# Patient Record
Sex: FEMALE | Race: WHITE | NOT HISPANIC OR LATINO | Employment: STUDENT | URBAN - METROPOLITAN AREA
[De-identification: names, ages, dates, MRNs, and addresses within clinical notes are randomized per-mention and may not be internally consistent; named-entity substitution may affect disease eponyms.]

---

## 2023-04-18 ENCOUNTER — TELEPHONE (OUTPATIENT)
Dept: PRIMARY CARE | Facility: CLINIC | Age: 18
End: 2023-04-18
Payer: COMMERCIAL

## 2023-04-18 DIAGNOSIS — F41.9 ANXIETY: Primary | ICD-10-CM

## 2023-04-18 RX ORDER — DULOXETINE 40 MG/1
40 CAPSULE, DELAYED RELEASE ORAL DAILY
Qty: 30 CAPSULE | Refills: 2 | Status: SHIPPED | OUTPATIENT
Start: 2023-04-18 | End: 2023-07-07

## 2023-04-30 DIAGNOSIS — F41.9 ANXIETY DISORDER, UNSPECIFIED: ICD-10-CM

## 2023-04-30 RX ORDER — DULOXETIN HYDROCHLORIDE 20 MG/1
CAPSULE, DELAYED RELEASE ORAL
Qty: 30 CAPSULE | Refills: 2 | OUTPATIENT
Start: 2023-04-30

## 2023-04-30 NOTE — TELEPHONE ENCOUNTER
Requested Prescriptions     Refused Prescriptions Disp Refills    DULoxetine (Cymbalta) 20 mg DR capsule [Pharmacy Med Name: DULOXETINE HCL DR 20 MG CAP] 30 capsule 2     Sig: TAKE 1 CAPSULE BY MOUTH EVERY DAY     Had renewed Rx for 2 caps per day

## 2023-05-09 NOTE — TELEPHONE ENCOUNTER
Patient felt she was doing significantly better on higher dose of duloxetine, plan to continue that through the end of her semester and revisit over the summer  Also seems to have had some benefit with use of probiotic, question continuing that - has been working with behavioral health at school

## 2023-05-17 DIAGNOSIS — J30.2 ACUTE SEASONAL ALLERGIC RHINITIS: ICD-10-CM

## 2023-05-17 RX ORDER — FLUTICASONE PROPIONATE 50 MCG
1 SPRAY, SUSPENSION (ML) NASAL DAILY
COMMUNITY
Start: 2014-07-27

## 2023-05-17 RX ORDER — PREDNISONE 10 MG/1
TABLET ORAL
COMMUNITY
Start: 2022-07-05 | End: 2023-05-17 | Stop reason: SDUPTHER

## 2023-05-17 RX ORDER — BECLOMETHASONE DIPROPIONATE HFA 80 UG/1
2 AEROSOL, METERED RESPIRATORY (INHALATION) 2 TIMES DAILY
COMMUNITY
End: 2023-07-21 | Stop reason: HOSPADM

## 2023-05-17 RX ORDER — ALBUTEROL SULFATE 90 UG/1
1-2 AEROSOL, METERED RESPIRATORY (INHALATION)
COMMUNITY
End: 2023-11-22 | Stop reason: HOSPADM

## 2023-05-17 RX ORDER — PROPRANOLOL HYDROCHLORIDE 10 MG/1
TABLET ORAL
COMMUNITY
Start: 2023-01-27 | End: 2023-07-21 | Stop reason: HOSPADM

## 2023-05-17 RX ORDER — PREDNISONE 10 MG/1
TABLET ORAL
Qty: 25 TABLET | Refills: 1 | Status: SHIPPED | OUTPATIENT
Start: 2023-05-17 | End: 2023-07-21 | Stop reason: SDUPTHER

## 2023-05-17 RX ORDER — HYDROXYZINE HYDROCHLORIDE 10 MG/1
TABLET, FILM COATED ORAL
COMMUNITY
Start: 2023-01-27 | End: 2023-07-21 | Stop reason: HOSPADM

## 2023-05-17 RX ORDER — ALBUTEROL SULFATE 0.83 MG/ML
SOLUTION RESPIRATORY (INHALATION)
COMMUNITY

## 2023-05-17 RX ORDER — NORGESTIMATE AND ETHINYL ESTRADIOL 7DAYSX3 28
1 KIT ORAL DAILY
COMMUNITY
End: 2023-11-22 | Stop reason: HOSPADM

## 2023-05-17 RX ORDER — MONTELUKAST SODIUM 10 MG/1
10 TABLET ORAL NIGHTLY
COMMUNITY
End: 2023-07-09

## 2023-05-23 DIAGNOSIS — Z00.00 HEALTHCARE MAINTENANCE: ICD-10-CM

## 2023-07-07 DIAGNOSIS — F41.9 ANXIETY: ICD-10-CM

## 2023-07-07 RX ORDER — DULOXETINE 40 MG/1
CAPSULE, DELAYED RELEASE ORAL
Qty: 30 CAPSULE | Refills: 1 | Status: SHIPPED | OUTPATIENT
Start: 2023-07-07 | End: 2023-09-15

## 2023-07-08 DIAGNOSIS — J45.30 MILD PERSISTENT ASTHMA WITHOUT COMPLICATION (HHS-HCC): Primary | ICD-10-CM

## 2023-07-09 RX ORDER — MONTELUKAST SODIUM 10 MG/1
TABLET ORAL
Qty: 30 TABLET | Refills: 1 | Status: SHIPPED | OUTPATIENT
Start: 2023-07-09 | End: 2023-09-21

## 2023-07-14 ENCOUNTER — LAB (OUTPATIENT)
Dept: LAB | Facility: LAB | Age: 18
End: 2023-07-14
Payer: COMMERCIAL

## 2023-07-14 DIAGNOSIS — Z00.00 HEALTHCARE MAINTENANCE: ICD-10-CM

## 2023-07-14 LAB
ALANINE AMINOTRANSFERASE (SGPT) (U/L) IN SER/PLAS: 12 U/L (ref 7–45)
ALBUMIN (G/DL) IN SER/PLAS: 4.2 G/DL (ref 3.4–5)
ALKALINE PHOSPHATASE (U/L) IN SER/PLAS: 70 U/L (ref 33–110)
ANION GAP IN SER/PLAS: 11 MMOL/L (ref 10–20)
APPEARANCE, URINE: NORMAL
ASPARTATE AMINOTRANSFERASE (SGOT) (U/L) IN SER/PLAS: 15 U/L (ref 9–39)
BASOPHILS (10*3/UL) IN BLOOD BY AUTOMATED COUNT: 0.03 X10E9/L (ref 0–0.1)
BASOPHILS/100 LEUKOCYTES IN BLOOD BY AUTOMATED COUNT: 0.5 % (ref 0–2)
BILIRUBIN TOTAL (MG/DL) IN SER/PLAS: 0.4 MG/DL (ref 0–1.2)
BILIRUBIN, URINE: NEGATIVE
BLOOD, URINE: NEGATIVE
C REACTIVE PROTEIN (MG/L) IN SER/PLAS BY HIGH SENSIT: 0.9 MG/L
CALCIDIOL (25 OH VITAMIN D3) (NG/ML) IN SER/PLAS: 39 NG/ML
CALCIUM (MG/DL) IN SER/PLAS: 9.8 MG/DL (ref 8.6–10.6)
CARBON DIOXIDE, TOTAL (MMOL/L) IN SER/PLAS: 28 MMOL/L (ref 21–32)
CHLORIDE (MMOL/L) IN SER/PLAS: 102 MMOL/L (ref 98–107)
CHOLESTEROL (MG/DL) IN SER/PLAS: 233 MG/DL (ref 0–199)
CHOLESTEROL IN HDL (MG/DL) IN SER/PLAS: 62.8 MG/DL
CHOLESTEROL/HDL RATIO: 3.7
COLOR, URINE: YELLOW
CREATININE (MG/DL) IN SER/PLAS: 0.68 MG/DL (ref 0.5–1.05)
EOSINOPHILS (10*3/UL) IN BLOOD BY AUTOMATED COUNT: 0.17 X10E9/L (ref 0–0.7)
EOSINOPHILS/100 LEUKOCYTES IN BLOOD BY AUTOMATED COUNT: 2.7 % (ref 0–6)
ERYTHROCYTE DISTRIBUTION WIDTH (RATIO) BY AUTOMATED COUNT: 15.5 % (ref 11.5–14.5)
ERYTHROCYTE MEAN CORPUSCULAR HEMOGLOBIN CONCENTRATION (G/DL) BY AUTOMATED: 30.6 G/DL (ref 32–36)
ERYTHROCYTE MEAN CORPUSCULAR VOLUME (FL) BY AUTOMATED COUNT: 84 FL (ref 80–100)
ERYTHROCYTES (10*6/UL) IN BLOOD BY AUTOMATED COUNT: 4.5 X10E12/L (ref 4–5.2)
ESTIMATED AVERAGE GLUCOSE FOR HBA1C: 117 MG/DL
GFR FEMALE: >90 ML/MIN/1.73M2
GLUCOSE (MG/DL) IN SER/PLAS: 88 MG/DL (ref 74–99)
GLUCOSE, URINE: NEGATIVE MG/DL
HEMATOCRIT (%) IN BLOOD BY AUTOMATED COUNT: 37.6 % (ref 36–46)
HEMOGLOBIN (G/DL) IN BLOOD: 11.5 G/DL (ref 12–16)
HEMOGLOBIN A1C/HEMOGLOBIN TOTAL IN BLOOD: 5.7 %
IMMATURE GRANULOCYTES/100 LEUKOCYTES IN BLOOD BY AUTOMATED COUNT: 0.3 % (ref 0–0.9)
KETONES, URINE: NEGATIVE MG/DL
LDL: 139 MG/DL (ref 0–109)
LEUKOCYTE ESTERASE, URINE: NEGATIVE
LEUKOCYTES (10*3/UL) IN BLOOD BY AUTOMATED COUNT: 6.4 X10E9/L (ref 4.4–11.3)
LYMPHOCYTES (10*3/UL) IN BLOOD BY AUTOMATED COUNT: 2.58 X10E9/L (ref 1.2–4.8)
LYMPHOCYTES/100 LEUKOCYTES IN BLOOD BY AUTOMATED COUNT: 40.6 % (ref 13–44)
MONOCYTES (10*3/UL) IN BLOOD BY AUTOMATED COUNT: 0.48 X10E9/L (ref 0.1–1)
MONOCYTES/100 LEUKOCYTES IN BLOOD BY AUTOMATED COUNT: 7.5 % (ref 2–10)
NEUTROPHILS (10*3/UL) IN BLOOD BY AUTOMATED COUNT: 3.08 X10E9/L (ref 1.2–7.7)
NEUTROPHILS/100 LEUKOCYTES IN BLOOD BY AUTOMATED COUNT: 48.4 % (ref 40–80)
NITRITE, URINE: NEGATIVE
NON HDL CHOLESTEROL: 170 MG/DL (ref 0–119)
NRBC (PER 100 WBCS) BY AUTOMATED COUNT: 0 /100 WBC (ref 0–0)
PH, URINE: 8 (ref 5–8)
PLATELETS (10*3/UL) IN BLOOD AUTOMATED COUNT: 264 X10E9/L (ref 150–450)
POTASSIUM (MMOL/L) IN SER/PLAS: 4.4 MMOL/L (ref 3.5–5.3)
PROTEIN TOTAL: 7 G/DL (ref 6.4–8.2)
PROTEIN, URINE: NEGATIVE MG/DL
SODIUM (MMOL/L) IN SER/PLAS: 137 MMOL/L (ref 136–145)
SPECIFIC GRAVITY, URINE: 1.02 (ref 1–1.03)
THYROTROPIN (MIU/L) IN SER/PLAS BY DETECTION LIMIT <= 0.05 MIU/L: 1.91 MIU/L (ref 0.44–3.98)
TRIGLYCERIDE (MG/DL) IN SER/PLAS: 158 MG/DL (ref 0–149)
UREA NITROGEN (MG/DL) IN SER/PLAS: 5 MG/DL (ref 6–23)
UROBILINOGEN, URINE: <2 MG/DL (ref 0–1.9)
VLDL: 32 MG/DL (ref 0–40)

## 2023-07-14 PROCEDURE — 85025 COMPLETE CBC W/AUTO DIFF WBC: CPT

## 2023-07-14 PROCEDURE — 36415 COLL VENOUS BLD VENIPUNCTURE: CPT

## 2023-07-14 PROCEDURE — 83036 HEMOGLOBIN GLYCOSYLATED A1C: CPT

## 2023-07-14 PROCEDURE — 81003 URINALYSIS AUTO W/O SCOPE: CPT

## 2023-07-14 PROCEDURE — 80053 COMPREHEN METABOLIC PANEL: CPT

## 2023-07-14 PROCEDURE — 80061 LIPID PANEL: CPT

## 2023-07-14 PROCEDURE — 82306 VITAMIN D 25 HYDROXY: CPT

## 2023-07-14 PROCEDURE — 86141 C-REACTIVE PROTEIN HS: CPT

## 2023-07-14 PROCEDURE — 84443 ASSAY THYROID STIM HORMONE: CPT

## 2023-07-21 ENCOUNTER — OFFICE VISIT (OUTPATIENT)
Dept: PRIMARY CARE | Facility: CLINIC | Age: 18
End: 2023-07-21
Payer: COMMERCIAL

## 2023-07-21 VITALS
HEART RATE: 107 BPM | BODY MASS INDEX: 25.33 KG/M2 | OXYGEN SATURATION: 99 % | TEMPERATURE: 98.2 F | DIASTOLIC BLOOD PRESSURE: 70 MMHG | WEIGHT: 152 LBS | HEIGHT: 65 IN | SYSTOLIC BLOOD PRESSURE: 112 MMHG

## 2023-07-21 DIAGNOSIS — E78.5 DYSLIPIDEMIA: ICD-10-CM

## 2023-07-21 DIAGNOSIS — J45.40 MODERATE PERSISTENT ASTHMA WITHOUT COMPLICATION (HHS-HCC): ICD-10-CM

## 2023-07-21 DIAGNOSIS — Z30.9 ENCOUNTER FOR CONTRACEPTIVE MANAGEMENT, UNSPECIFIED TYPE: ICD-10-CM

## 2023-07-21 DIAGNOSIS — Z00.01 ENCOUNTER FOR GENERAL ADULT MEDICAL EXAMINATION WITH ABNORMAL FINDINGS: Primary | ICD-10-CM

## 2023-07-21 DIAGNOSIS — F41.9 ANXIETY: ICD-10-CM

## 2023-07-21 DIAGNOSIS — J30.2 ACUTE SEASONAL ALLERGIC RHINITIS: ICD-10-CM

## 2023-07-21 PROBLEM — E61.1 IRON DEFICIENCY: Status: ACTIVE | Noted: 2023-07-21

## 2023-07-21 PROBLEM — L20.9 ATOPIC DERMATITIS: Status: ACTIVE | Noted: 2023-07-21

## 2023-07-21 PROBLEM — N94.6 DYSMENORRHEA: Status: ACTIVE | Noted: 2023-07-21

## 2023-07-21 PROBLEM — J30.9 ALLERGIC RHINITIS: Status: ACTIVE | Noted: 2023-07-21

## 2023-07-21 PROCEDURE — UHSPHYS PR UH SELECT PHYSICAL: Performed by: FAMILY MEDICINE

## 2023-07-21 RX ORDER — FLUTICASONE PROPIONATE AND SALMETEROL XINAFOATE 115; 21 UG/1; UG/1
1-2 AEROSOL, METERED RESPIRATORY (INHALATION)
Qty: 12 G | Refills: 11 | Status: SHIPPED | OUTPATIENT
Start: 2023-07-21 | End: 2023-07-21

## 2023-07-21 RX ORDER — PREDNISONE 10 MG/1
TABLET ORAL
Qty: 25 TABLET | Refills: 1 | Status: SHIPPED | OUTPATIENT
Start: 2023-07-21

## 2023-07-21 RX ORDER — MOMETASONE FUROATE AND FORMOTEROL FUMARATE DIHYDRATE 50; 5 UG/1; UG/1
1 AEROSOL RESPIRATORY (INHALATION) 2 TIMES DAILY
Qty: 13 G | Refills: 1 | Status: SHIPPED | OUTPATIENT
Start: 2023-07-21 | End: 2023-11-22 | Stop reason: HOSPADM

## 2023-07-21 ASSESSMENT — ENCOUNTER SYMPTOMS
DYSURIA: 0
BLOOD IN STOOL: 0
MYALGIAS: 0
ARTHRALGIAS: 0
WEAKNESS: 0
BACK PAIN: 0
FATIGUE: 0
FREQUENCY: 0
TREMORS: 0
FEVER: 0
EYE PAIN: 0
CONSTIPATION: 1
COUGH: 0
DIZZINESS: 0
SHORTNESS OF BREATH: 0
ABDOMINAL PAIN: 0
JOINT SWELLING: 0
HEADACHES: 0
UNEXPECTED WEIGHT CHANGE: 0
DIARRHEA: 0
SLEEP DISTURBANCE: 0
DYSPHORIC MOOD: 0
DIAPHORESIS: 0

## 2023-07-21 ASSESSMENT — PAIN SCALES - GENERAL: PAINLEVEL: 0-NO PAIN

## 2023-07-21 NOTE — PROGRESS NOTES
Subjective   Patient ID: Edie Verdugo is a 17 y.o. female who presents for Annual Exam ([facility] ).  HPI  1) history of asthma  She is using her albuterol puffer once to twice a day currently  Still taking Singulair along with Qvar  Apparently has not used a long-acting bronchodilator in the past  Willing to try something new to obviate need for albuterol at current frequency and hopefully likely able to get off of montelukast  Tolerating activity well    2) recent lab test reviewed and note that she is probably still deficient in iron  She has not been taking as of late  Recommend she get back on the iron sulfate tablet she has had before and take 1 3 times a week    3) history of anxiety, currently not seeing any type of  or other psychologist  Plans to reestablish when she is back at school  Feels she is in a good place currently  Taking and tolerating duloxetine well  No current mood issues    4) contraceptive management  Would like to get off taking a hormone pill every day  Is interested in an IUD  Would like to meet with provider at some point in time for such consideration    5) bloating issues and constipation  Feels dietary related, would like to meet with nutritionist    6) recent lab tests reveal unfavorable lipid profile, part of this may be driven by her occasional use of steroids which in-turn increase glucose values and thus her triglycerides and and hence LDL   No family history of early cardiac disease    Review of Systems   Constitutional:  Negative for diaphoresis, fatigue, fever and unexpected weight change.   HENT:  Negative for ear pain and hearing loss.    Eyes:  Negative for pain and visual disturbance.   Respiratory:  Negative for cough and shortness of breath.    Cardiovascular:  Negative for chest pain and leg swelling.   Gastrointestinal:  Positive for constipation. Negative for abdominal pain, blood in stool and diarrhea.   Genitourinary:  Negative for dysuria and  "frequency.   Musculoskeletal:  Negative for arthralgias, back pain, joint swelling and myalgias.   Skin:  Negative for rash.   Neurological:  Negative for dizziness, tremors, weakness and headaches.   Psychiatric/Behavioral:  Negative for behavioral problems, dysphoric mood and sleep disturbance.        Objective   /70 (BP Location: Left arm, Patient Position: Sitting, BP Cuff Size: Adult)   Pulse 107   Temp 36.8 °C (98.2 °F)   Ht 1.651 m (5' 5\")   Wt 68.9 kg (152 lb)   SpO2 99%   BMI 25.29 kg/m²     Physical Exam  Vitals and nursing note reviewed.   Constitutional:       General: She is not in acute distress.     Appearance: She is not ill-appearing.   HENT:      Head: Normocephalic and atraumatic.      Right Ear: Tympanic membrane normal.      Left Ear: Tympanic membrane normal.      Mouth/Throat:      Pharynx: No posterior oropharyngeal erythema.   Eyes:      General: No scleral icterus.     Extraocular Movements: Extraocular movements intact.      Pupils: Pupils are equal, round, and reactive to light.   Cardiovascular:      Rate and Rhythm: Normal rate and regular rhythm.      Heart sounds: No murmur heard.  Pulmonary:      Breath sounds: Normal breath sounds. No wheezing or rhonchi.   Abdominal:      General: Bowel sounds are normal. There is no distension.      Palpations: Abdomen is soft.      Tenderness: There is no abdominal tenderness.   Musculoskeletal:         General: Normal range of motion.      Cervical back: Normal range of motion.      Right lower leg: No edema.      Left lower leg: No edema.   Lymphadenopathy:      Cervical: No cervical adenopathy.   Skin:     General: Skin is warm and dry.   Neurological:      Mental Status: She is alert and oriented to person, place, and time. Mental status is at baseline.      Motor: No weakness.      Coordination: Coordination normal.      Deep Tendon Reflexes: Reflexes normal.   Psychiatric:         Mood and Affect: Mood normal.         Behavior: " Behavior normal.         Thought Content: Thought content normal.         Judgment: Judgment normal.         Assessment/Plan   Problem List Items Addressed This Visit       Moderate persistent asthma without complication    Relevant Medications    fluticasone propion-salmeteroL (Advair HFA) 115-21 mcg/actuation inhaler     Other Visit Diagnoses       Encounter for general adult medical examination with abnormal findings    -  Primary    Acute seasonal allergic rhinitis        Relevant Medications    predniSONE (Deltasone) 10 mg tablet    Encounter for contraceptive management, unspecified type        Relevant Orders    Referral to Obstetrics / Gynecology    Dyslipidemia        Anxiety            1) asthma, not well enough managed  We will change from Qvar to Advair HFA  Encouraged her to reach out by late next week if she has not noticed any improvement    2) likely iron deficient still  Recommend she get back on iron sulfate tablets 1 3 times per week    3) anxiety issues appear well controlled/stable    4) interested in alternative form of contraception and would be interested in discussing IUD placement with provider here, appointment scheduled for December    5) would like to meet with nutritionist as she still having some constipation and bloating  Appointment arranged for her next month with Sheila Marie

## 2023-08-02 ENCOUNTER — TELEPHONE (OUTPATIENT)
Dept: PRIMARY CARE | Facility: CLINIC | Age: 18
End: 2023-08-02
Payer: COMMERCIAL

## 2023-08-02 NOTE — TELEPHONE ENCOUNTER
Called patient, no answer left message  Rather confident that she really does not need to monitor her sugar  She really is not having any hyperglycemic symptoms and her A1c was only a 10th of a point above normal - her FPG was 88  Past few months she has been using prednisone which likely would have elevated sugars and hence her A1c -  her previous glucose a year ago was normal - also, she was not spilling any sugar into her urine  Thus, I am not convinced watching her sugar would be of benefit - it may actually heighten some of her anxiety  Left a voicemail for her to call back if any questions or concerns and also sent her a text

## 2023-09-15 DIAGNOSIS — F41.9 ANXIETY: ICD-10-CM

## 2023-09-15 RX ORDER — DULOXETINE 40 MG/1
CAPSULE, DELAYED RELEASE ORAL
Qty: 90 CAPSULE | Refills: 2 | Status: SHIPPED | OUTPATIENT
Start: 2023-09-15

## 2023-09-21 DIAGNOSIS — J45.30 MILD PERSISTENT ASTHMA WITHOUT COMPLICATION (HHS-HCC): ICD-10-CM

## 2023-09-21 RX ORDER — MONTELUKAST SODIUM 10 MG/1
TABLET ORAL
Qty: 30 TABLET | Refills: 11 | Status: SHIPPED | OUTPATIENT
Start: 2023-09-21

## 2023-10-11 ENCOUNTER — TELEPHONE (OUTPATIENT)
Dept: PRIMARY CARE | Facility: CLINIC | Age: 18
End: 2023-10-11
Payer: COMMERCIAL

## 2023-10-11 DIAGNOSIS — E61.1 IRON DEFICIENCY: Primary | ICD-10-CM

## 2023-10-11 NOTE — PROGRESS NOTES
Called re, heavy bleeding  Does not sound in significant distress  Started yesterday AM   A little light-headed   On hormones - no prior pelvic exam   Was having a bit of a discharge prior to this   No prior infection - possible could be pregnant but had normal / anticipated menses ~2 weeks ago   No fever or chills   Continue her iron and OCP - follow  Enc call back if not tapering   Consider to be seen at [facility]

## 2023-10-11 NOTE — TELEPHONE ENCOUNTER
Called re, heavy bleeding  Does not sound in significant distress  Started yesterday AM   A little light-headed   On hormones - no prior pelvic exam   Was having a bit of a discharge prior to this   No prior infection - possible could be pregnant but had normal / anticipated menses ~2 weeks ago   No fever or chills   Continue her iron and OCP - follow  Enc call back if not tapering   Consider to be seen at [facility]

## 2023-10-24 DIAGNOSIS — F41.9 ANXIETY: ICD-10-CM

## 2023-10-24 RX ORDER — DULOXETINE 40 MG/1
40 CAPSULE, DELAYED RELEASE ORAL DAILY
Qty: 90 CAPSULE | Refills: 2 | Status: SHIPPED | OUTPATIENT
Start: 2023-10-24 | End: 2023-11-20 | Stop reason: SDUPTHER

## 2023-11-04 ENCOUNTER — TELEPHONE (OUTPATIENT)
Dept: PRIMARY CARE | Facility: CLINIC | Age: 18
End: 2023-11-04
Payer: COMMERCIAL

## 2023-11-04 DIAGNOSIS — R11.0 NAUSEA: Primary | ICD-10-CM

## 2023-11-04 RX ORDER — ONDANSETRON 4 MG/1
4 TABLET, FILM COATED ORAL EVERY 8 HOURS PRN
Qty: 20 TABLET | Refills: 0 | Status: SHIPPED | OUTPATIENT
Start: 2023-11-04 | End: 2023-11-11

## 2023-11-05 NOTE — TELEPHONE ENCOUNTER
Patient texted me that she was feeling very nauseous yesterday and had her brother's wedding to attend to  No other ill symptoms reported such as vomiting or diarrhea or fever or chills  Sent in prescription for ondansetron as ordered  Did do follow-up today and she noted she was significantly better and on the vent medication very helpful and tolerable    Requested Prescriptions     Signed Prescriptions Disp Refills    ondansetron (Zofran) 4 mg tablet 20 tablet 0     Sig: Take 1 tablet (4 mg) by mouth every 8 hours if needed for nausea or vomiting for up to 7 days.     Authorizing Provider: EMMANUEL VALERO

## 2023-11-06 ENCOUNTER — TELEPHONE (OUTPATIENT)
Dept: PRIMARY CARE | Facility: CLINIC | Age: 18
End: 2023-11-06
Payer: COMMERCIAL

## 2023-11-06 DIAGNOSIS — N93.8 DUB (DYSFUNCTIONAL UTERINE BLEEDING): Primary | ICD-10-CM

## 2023-11-06 RX ORDER — MEDROXYPROGESTERONE ACETATE 5 MG/1
5 TABLET ORAL 2 TIMES DAILY
COMMUNITY
Start: 2023-10-13 | End: 2023-11-06 | Stop reason: SDUPTHER

## 2023-11-06 RX ORDER — MEDROXYPROGESTERONE ACETATE 5 MG/1
5 TABLET ORAL 2 TIMES DAILY
Qty: 14 TABLET | Refills: 0 | Status: SHIPPED | OUTPATIENT
Start: 2023-11-06 | End: 2023-11-22 | Stop reason: WASHOUT

## 2023-11-06 NOTE — TELEPHONE ENCOUNTER
Called and discussed with patient  Started again today  No fever or chills or other ill symptoms  She did have significant stress over the weekend regarding her brother's wedding and her participation in  Endo prescribing her some Zofran to manage things which she said worked well  However recurrence of this dysfunctional uterine bleeding  Discussed treating as was previously while on campus with same hormone and same dosing  As this did recur relatively soon would like her to get evaluation by OB/GYN while she is home for the holiday  Really her only available day at the moment seems to be the Friday after Thanksgiving (arr W leaves Sun)  Question if they would be able to do an ultrasound in the office or if needs formal US    Requested Prescriptions     Signed Prescriptions Disp Refills    medroxyPROGESTERone (Provera) 5 mg tablet 14 tablet 0     Sig: Take 1 tablet (5 mg) by mouth 2 times a day for 7 days.     Authorizing Provider: EMMANUEL VALERO

## 2023-11-09 SDOH — ECONOMIC STABILITY: HOUSING INSECURITY
IN THE LAST 12 MONTHS, WAS THERE A TIME WHEN YOU DID NOT HAVE A STEADY PLACE TO SLEEP OR SLEPT IN A SHELTER (INCLUDING NOW)?: NO

## 2023-11-09 SDOH — ECONOMIC STABILITY: FOOD INSECURITY: WITHIN THE PAST 12 MONTHS, YOU WORRIED THAT YOUR FOOD WOULD RUN OUT BEFORE YOU GOT MONEY TO BUY MORE.: NEVER TRUE

## 2023-11-09 SDOH — ECONOMIC STABILITY: TRANSPORTATION INSECURITY
IN THE PAST 12 MONTHS, HAS LACK OF TRANSPORTATION KEPT YOU FROM MEETINGS, WORK, OR FROM GETTING THINGS NEEDED FOR DAILY LIVING?: NO

## 2023-11-09 SDOH — ECONOMIC STABILITY: INCOME INSECURITY: HOW HARD IS IT FOR YOU TO PAY FOR THE VERY BASICS LIKE FOOD, HOUSING, MEDICAL CARE, AND HEATING?: NOT HARD AT ALL

## 2023-11-09 SDOH — ECONOMIC STABILITY: FOOD INSECURITY: WITHIN THE PAST 12 MONTHS, THE FOOD YOU BOUGHT JUST DIDN'T LAST AND YOU DIDN'T HAVE MONEY TO GET MORE.: NEVER TRUE

## 2023-11-10 ENCOUNTER — OFFICE VISIT (OUTPATIENT)
Dept: OBGYN CLINIC | Age: 18
End: 2023-11-10

## 2023-11-10 VITALS
WEIGHT: 152 LBS | BODY MASS INDEX: 25.33 KG/M2 | SYSTOLIC BLOOD PRESSURE: 104 MMHG | DIASTOLIC BLOOD PRESSURE: 60 MMHG | HEIGHT: 65 IN

## 2023-11-10 DIAGNOSIS — Z30.8 ENCOUNTER FOR OTHER CONTRACEPTIVE MANAGEMENT: ICD-10-CM

## 2023-11-10 DIAGNOSIS — Z11.3 SCREEN FOR STD (SEXUALLY TRANSMITTED DISEASE): Primary | ICD-10-CM

## 2023-11-10 RX ORDER — MONTELUKAST SODIUM 10 MG/1
1 TABLET ORAL
COMMUNITY
Start: 2022-09-29

## 2023-11-10 RX ORDER — DULOXETINE 40 MG/1
1 CAPSULE, DELAYED RELEASE ORAL DAILY
COMMUNITY
Start: 2023-10-18

## 2023-11-10 RX ORDER — MEDROXYPROGESTERONE ACETATE 10 MG/1
5 TABLET ORAL DAILY
COMMUNITY

## 2023-11-10 RX ORDER — ALBUTEROL SULFATE 90 UG/1
2 AEROSOL, METERED RESPIRATORY (INHALATION) EVERY 4 HOURS PRN
COMMUNITY
Start: 2019-04-02

## 2023-11-10 RX ORDER — NORGESTIMATE AND ETHINYL ESTRADIOL 7DAYSX3 28
1 KIT ORAL DAILY
COMMUNITY

## 2023-11-10 RX ORDER — MOMETASONE FUROATE AND FORMOTEROL FUMARATE DIHYDRATE 100; 5 UG/1; UG/1
AEROSOL RESPIRATORY (INHALATION)
COMMUNITY
Start: 2023-10-30

## 2023-11-10 RX ORDER — NORGESTIMATE AND ETHINYL ESTRADIOL 7DAYSX3 28
KIT ORAL
COMMUNITY

## 2023-11-10 RX ORDER — FLUTICASONE PROPIONATE 50 MCG
1 SPRAY, SUSPENSION (ML) NASAL DAILY
COMMUNITY
Start: 2014-07-27

## 2023-11-10 RX ORDER — NORGESTIMATE AND ETHINYL ESTRADIOL 0.25-0.035
1 KIT ORAL DAILY
Qty: 1 PACKET | Refills: 12 | Status: SHIPPED | OUTPATIENT
Start: 2023-11-10

## 2023-11-10 RX ORDER — EPINEPHRINE 0.15 MG/.3ML
INJECTION INTRAMUSCULAR
COMMUNITY
Start: 2005-06-06

## 2023-11-10 NOTE — TELEPHONE ENCOUNTER
Patient is scheduled to see OB locally and will get evaluated soon.  She is then scheduled here in [place] on 12/15/23 and does want to keep that appointment as well.

## 2023-11-10 NOTE — PROGRESS NOTES
CC:   Chief Complaint   Patient presents with    Irregular Menses     Vaginal bleeding between periods x 2 months         HPI:    Aleyda Butler  is a 21 y.o. , , No LMP recorded. ,  who is seen for AUB. Has been on Triphasic OCP x 3-4 years. The last 2 months she has had heavy bleeding midcycle. Went to [facility] center at [place] and was given Rx for Provera which helped the bleeding. No changes in weight or meds. + constipation. Anxiety that is well controlled. + cramping with the bleeding. No other sx. Contraception:  Triphasic OCP    Hb and TSH normal at ER       GYN HISTORY:  As per HPI     Hx STDs:  no      Current Outpatient Medications on File Prior to Visit   Medication Sig Dispense Refill    medroxyPROGESTERone (PROVERA) 10 MG tablet Take 0.5 tablets by mouth daily      Norgestim-Eth Estrad Triphasic 0.18/0.215/0.25 MG-35 MCG TABS Take 1 tablet by mouth daily      Norgestim-Eth Estrad Triphasic (TRI-ESTARYLLA) 0.18/0.215/0.25 MG-35 MCG TABS       montelukast (SINGULAIR) 10 MG tablet Take 1 tablet by mouth nightly      DULERA 100-5 MCG/ACT inhaler       fluticasone (FLONASE) 50 MCG/ACT nasal spray 1 spray by Nasal route daily      EPINEPHrine (EPIPEN JR) 0.15 MG/0.3ML SOAJ use as needed for anaphylaxis      DULoxetine HCl 40 MG CPEP Take 1 tablet by mouth daily      albuterol sulfate HFA (PROVENTIL;VENTOLIN;PROAIR) 108 (90 Base) MCG/ACT inhaler Inhale 2 puffs into the lungs every 4 hours as needed       No current facility-administered medications on file prior to visit. Past Medical History:   Diagnosis Date    Anxiety     Asthma          No past surgical history on file.       Outpatient Encounter Medications as of 11/10/2023   Medication Sig Dispense Refill    medroxyPROGESTERone (PROVERA) 10 MG tablet Take 0.5 tablets by mouth daily      Norgestim-Eth Estrad Triphasic 0.18/0.215/0.25 MG-35 MCG TABS Take 1 tablet by mouth daily      Norgestim-Eth Estrad Triphasic (TRI-ESTARYLLA) 0.18/0.215/0.25

## 2023-11-13 DIAGNOSIS — N92.1 MENORRHAGIA WITH IRREGULAR CYCLE: Primary | ICD-10-CM

## 2023-11-13 DIAGNOSIS — N94.6 DYSMENORRHEA: ICD-10-CM

## 2023-11-13 NOTE — PROGRESS NOTES
She will actually be back in town next Monday night - so could see someone Tues or Weds - please see if anything available with anyone she'd be comfortable seeing - Thanks!

## 2023-11-14 LAB
C TRACH RRNA SPEC QL NAA+PROBE: NEGATIVE
N GONORRHOEA RRNA SPEC QL NAA+PROBE: NEGATIVE
SPECIMEN SOURCE: NORMAL
T VAGINALIS RRNA SPEC QL NAA+PROBE: NEGATIVE

## 2023-11-20 DIAGNOSIS — F41.9 ANXIETY: ICD-10-CM

## 2023-11-20 RX ORDER — DULOXETINE 40 MG/1
40 CAPSULE, DELAYED RELEASE ORAL DAILY
Qty: 90 CAPSULE | Refills: 1 | Status: SHIPPED | OUTPATIENT
Start: 2023-11-20

## 2023-11-22 ENCOUNTER — HOSPITAL ENCOUNTER (OUTPATIENT)
Dept: RADIOLOGY | Facility: HOSPITAL | Age: 18
Discharge: HOME | End: 2023-11-22
Payer: COMMERCIAL

## 2023-11-22 ENCOUNTER — OFFICE VISIT (OUTPATIENT)
Dept: OBSTETRICS AND GYNECOLOGY | Facility: CLINIC | Age: 18
End: 2023-11-22
Payer: COMMERCIAL

## 2023-11-22 VITALS
WEIGHT: 154.2 LBS | DIASTOLIC BLOOD PRESSURE: 85 MMHG | BODY MASS INDEX: 24.78 KG/M2 | HEIGHT: 66 IN | SYSTOLIC BLOOD PRESSURE: 127 MMHG

## 2023-11-22 DIAGNOSIS — N93.9 ABNORMAL UTERINE BLEEDING (AUB): ICD-10-CM

## 2023-11-22 DIAGNOSIS — Z11.3 SCREEN FOR STD (SEXUALLY TRANSMITTED DISEASE): ICD-10-CM

## 2023-11-22 DIAGNOSIS — N93.9 ABNORMAL UTERINE BLEEDING (AUB): Primary | ICD-10-CM

## 2023-11-22 PROCEDURE — 76830 TRANSVAGINAL US NON-OB: CPT

## 2023-11-22 PROCEDURE — 99204 OFFICE O/P NEW MOD 45 MIN: CPT | Performed by: OBSTETRICS & GYNECOLOGY

## 2023-11-22 PROCEDURE — 76856 US EXAM PELVIC COMPLETE: CPT

## 2023-11-22 PROCEDURE — 76830 TRANSVAGINAL US NON-OB: CPT | Performed by: RADIOLOGY

## 2023-11-22 PROCEDURE — 76856 US EXAM PELVIC COMPLETE: CPT | Performed by: RADIOLOGY

## 2023-11-22 RX ORDER — MOMETASONE FUROATE AND FORMOTEROL FUMARATE DIHYDRATE 100; 5 UG/1; UG/1
AEROSOL RESPIRATORY (INHALATION)
COMMUNITY
Start: 2023-10-30

## 2023-11-22 RX ORDER — INHALER, ASSIST DEVICES
SPACER (EA) MISCELLANEOUS
COMMUNITY
Start: 2023-07-30

## 2023-11-22 RX ORDER — MOMETASONE FUROATE AND FORMOTEROL FUMARATE DIHYDRATE 100; 5 UG/1; UG/1
AEROSOL RESPIRATORY (INHALATION)
COMMUNITY
Start: 2023-09-29 | End: 2024-05-07 | Stop reason: SDUPTHER

## 2023-11-22 RX ORDER — NORGESTIMATE AND ETHINYL ESTRADIOL 0.25-0.035
1 KIT ORAL DAILY
COMMUNITY
Start: 2023-11-10

## 2023-11-22 ASSESSMENT — ENCOUNTER SYMPTOMS
HEMATOLOGIC/LYMPHATIC NEGATIVE: 0
ALLERGIC/IMMUNOLOGIC NEGATIVE: 0
ENDOCRINE NEGATIVE: 0
CONSTITUTIONAL NEGATIVE: 0
CARDIOVASCULAR NEGATIVE: 0
GASTROINTESTINAL NEGATIVE: 0
EYES NEGATIVE: 0
PSYCHIATRIC NEGATIVE: 0
MUSCULOSKELETAL NEGATIVE: 0
NEUROLOGICAL NEGATIVE: 0
RESPIRATORY NEGATIVE: 0

## 2023-11-22 ASSESSMENT — PAIN SCALES - GENERAL: PAINLEVEL: 0-NO PAIN

## 2023-11-22 ASSESSMENT — PATIENT HEALTH QUESTIONNAIRE - PHQ9
2. FEELING DOWN, DEPRESSED OR HOPELESS: NOT AT ALL
SUM OF ALL RESPONSES TO PHQ9 QUESTIONS 1 AND 2: 0
1. LITTLE INTEREST OR PLEASURE IN DOING THINGS: NOT AT ALL

## 2023-11-22 NOTE — PROGRESS NOTES
SUBJECTIVE    17 y.o.  Having periods female presents for   Chief Complaint   Patient presents with    Contraception     New Pt here for IUD/Discussion. Last Pap none on File.      Pt is a sophomore at [employer].  Only significant medical hx is asthma.  She is on OCPs for contraception and cramping/dysmenorrhea.  Usually her menses are light but a few months ago she started bleeding pretty heavily, saturating super-plus tampons.  Bleeding lasted 2.5 weeks and eventually stopped with Provera (in addition to her OCP).  This happened again the next month. She went to the [facility] and eventually the ED and she was told to see a GYN.  She has been on OCPs since age 14 -- usually menses are predictable and 4d in length.  OCPs also helped with cramping.    Hgb in ED earlier this month was 12.1.    Sexually active-- one current partner for one year.    OB/GYN History  Patient's last menstrual period was 2023.    Social History     Substance and Sexual Activity   Sexual Activity Yes    Partners: Male    Birth control/protection: Other       Sexually transmitted infections: denies    OB History    Para Term  AB Living   0 0 0 0 0 0   SAB IAB Ectopic Multiple Live Births   0 0 0 0 0       Past Medical History  She has a past medical history of Allergic rhinitis, unspecified (2021), Atopic dermatitis, unspecified (2018), Dysmenorrhea, unspecified (2020), Moderate persistent asthma, uncomplicated (2022), Personal history of other infectious and parasitic diseases (2016), and Urticaria, unspecified (2016).    Surgical History  She has no past surgical history on file.     Social History  She reports that she has never smoked. She does not have any smokeless tobacco history on file. She reports current alcohol use. She reports that she does not use drugs.    Screenings  Social Determinants of Health     Tobacco Use: Unknown (2023)    Patient History      "Smoking Tobacco Use: Never     Smokeless Tobacco Use: Unknown     Passive Exposure: Not on file   Alcohol Use: Not on file   Financial Resource Strain: Not on file   Food Insecurity: Not on file   Transportation Needs: Not on file   Physical Activity: Not on file   Stress: Not on file   Social Connections: Not on file   Intimate Partner Violence: Not on file   Depression: Not at risk (11/22/2023)    PHQ-2     PHQ-2 Score: 0   Housing Stability: Not on file   Utilities: Not on file   Digital Equity: Not on file         OBJECTIVE  Vitals:    11/22/23 1352   BP: 127/85   Weight: 69.9 kg (154 lb 3.2 oz)   Height: 1.676 m (5' 6\")     Body mass index is 24.89 kg/m².     OBGyn Exam deferred    Last Pap: NA    Immunization History   Administered Date(s) Administered    Moderna SARS-CoV-2 Vaccination 12/12/2021    Pfizer Purple Cap SARS-CoV-2 04/03/2021, 04/23/2021        ASSESSMENT & PLAN  Problem List Items Addressed This Visit    None  Visit Diagnoses       Abnormal uterine bleeding (AUB)    -  Primary    Relevant Orders    US PELVIS TRANSABDOMINAL WITH TRANSVAGINAL    C. Trachomatis / N. Gonorrhoeae, Amplified Detection    Trichomonas vaginalis, Nucleic Acid Detection    Screen for STD (sexually transmitted disease)        Relevant Orders    C. Trachomatis / N. Gonorrhoeae, Amplified Detection    Trichomonas vaginalis, Nucleic Acid Detection            Follow up: Reviewed symptoms with patient.  Given rather abrupt change in bleeding pattern, will rule out STI's as well uterine anatomic abnormalities.  If testing is normal, discussed potential contraceptive and bleeding benefits of progesterone based IUDs. She is returning to school on Sunday-- will see if ultrasound can be scheduled Friday 11/24.  If not, will obtain as soon as she returns home with plan to place IUD before Asha.    Crispin Noble MD  Obstetrics & Gynecology  11/22/23  "

## 2023-11-27 ENCOUNTER — TELEPHONE (OUTPATIENT)
Dept: PRIMARY CARE | Facility: CLINIC | Age: 18
End: 2023-11-27
Payer: COMMERCIAL

## 2023-11-28 ENCOUNTER — TELEPHONE (OUTPATIENT)
Dept: OBSTETRICS AND GYNECOLOGY | Facility: CLINIC | Age: 18
End: 2023-11-28
Payer: COMMERCIAL

## 2023-11-28 NOTE — TELEPHONE ENCOUNTER
----- Message from Crispin Noble MD sent at 11/27/2023  8:46 AM EST -----  I called the patient and left her a message to call regarding her results.  Just let me know if she calls. Thanks!

## 2023-11-29 NOTE — TELEPHONE ENCOUNTER
Called back and discussed with patient last night  Discussed the nature of a retroverted uterus which does not complicate her future efforts at pregnancy nor appears to be related to any adverse pregnancy outcomes - otherwise no concerning  noted findings noted on her ultrasound  She does have plans to return to [place] to likely have IUD placed

## 2023-12-14 ENCOUNTER — APPOINTMENT (OUTPATIENT)
Dept: OBSTETRICS AND GYNECOLOGY | Facility: CLINIC | Age: 18
End: 2023-12-14
Payer: COMMERCIAL

## 2023-12-21 ENCOUNTER — APPOINTMENT (OUTPATIENT)
Dept: OBSTETRICS AND GYNECOLOGY | Facility: CLINIC | Age: 18
End: 2023-12-21
Payer: COMMERCIAL

## 2024-01-19 ENCOUNTER — TELEPHONE (OUTPATIENT)
Dept: OBSTETRICS AND GYNECOLOGY | Facility: CLINIC | Age: 19
End: 2024-01-19
Payer: COMMERCIAL

## 2024-01-19 NOTE — TELEPHONE ENCOUNTER
L/m to contact office-gc/chlamydia orders from 11/22 had not been collected- Does patient still wish to have testing completed at a later date

## 2024-05-07 DIAGNOSIS — J45.40 MODERATE PERSISTENT ASTHMA WITHOUT COMPLICATION (HHS-HCC): ICD-10-CM

## 2024-05-07 RX ORDER — MOMETASONE FUROATE AND FORMOTEROL FUMARATE DIHYDRATE 100; 5 UG/1; UG/1
2 AEROSOL RESPIRATORY (INHALATION)
Qty: 13 G | Refills: 1 | Status: SHIPPED | OUTPATIENT
Start: 2024-05-07

## 2024-07-02 ENCOUNTER — APPOINTMENT (OUTPATIENT)
Dept: PRIMARY CARE | Facility: CLINIC | Age: 19
End: 2024-07-02
Payer: COMMERCIAL

## 2024-07-02 ENCOUNTER — TELEPHONE (OUTPATIENT)
Dept: PRIMARY CARE | Facility: CLINIC | Age: 19
End: 2024-07-02

## 2024-07-02 DIAGNOSIS — F41.9 ANXIETY: Primary | ICD-10-CM

## 2024-07-02 RX ORDER — DULOXETIN HYDROCHLORIDE 30 MG/1
30 CAPSULE, DELAYED RELEASE ORAL DAILY
Qty: 30 CAPSULE | Refills: 11 | Status: SHIPPED | OUTPATIENT
Start: 2024-07-02 | End: 2025-07-02

## 2024-07-02 NOTE — TELEPHONE ENCOUNTER
Called and discussed with patient  Issues started toward the end of May after the end of school  No blood in the stool or dark stool noted  Seems almost any type of food seems to aggravate things  Some more bloating than usual  She got back on the probiotic and has been on it for a matter of a few weeks and it has really not made a big enough difference but for decreasing the gas to some degree  Mostly looser stool  Does feel she is in a very good place living in an apartment on campus with 3 of her best friends  She has a job doing research 3 hours a day -no new supplements or medications noted  Her previous bowel problems were characterized by constipation  Hence, at this point wondering if we should pare back on her duloxetine as all other things are pretty much the same   Will try the following   Expect follow-up in 2 weeks or so    Requested Prescriptions     Signed Prescriptions Disp Refills    DULoxetine (Cymbalta) 30 mg DR capsule 30 capsule 11     Sig: Take 1 capsule (30 mg) by mouth once daily. Do not crush or chew.     Authorizing Provider: EMMANUEL VALERO

## 2024-07-25 ENCOUNTER — TELEPHONE (OUTPATIENT)
Dept: PRIMARY CARE | Facility: CLINIC | Age: 19
End: 2024-07-25
Payer: COMMERCIAL

## 2024-07-25 DIAGNOSIS — J02.9 PHARYNGITIS, UNSPECIFIED ETIOLOGY: Primary | ICD-10-CM

## 2024-07-25 RX ORDER — DOXYCYCLINE 100 MG/1
100 TABLET ORAL 2 TIMES DAILY
Qty: 14 TABLET | Refills: 0 | Status: SHIPPED | OUTPATIENT
Start: 2024-07-25 | End: 2024-08-01

## 2024-07-25 NOTE — TELEPHONE ENCOUNTER
Patient's father reached out this a.m.  Concerns that she has recurrent throat issue that she had back in May  At that time she was seen at an urgent care/[facility] and treated with doxycycline  Requesting same  No marked fever or chills reported  Just feeling a bit wiped out and has similar throat phenomenon  No wheezing breathing coughing congestion problems noted  Will try the following  Requested Prescriptions     Signed Prescriptions Disp Refills    doxycycline (Adoxa) 100 mg tablet 14 tablet 0     Sig: Take 1 tablet (100 mg) by mouth 2 times a day for 7 days. Take with a full glass of water     Authorizing Provider: EMMANUEL VALERO       Expect will follow-up in 48-72 hours or sooner if worsening/concerns

## 2024-08-07 ENCOUNTER — TELEPHONE (OUTPATIENT)
Dept: PRIMARY CARE | Facility: CLINIC | Age: 19
End: 2024-08-07
Payer: COMMERCIAL

## 2024-08-07 NOTE — TELEPHONE ENCOUNTER
Email received from the patient:  Please see below.   I did ask that she have the forms faxed over and we would take a look - likely would be no problems with completing.  As long as you approve, I will complete once received.    Hi Karire,  Over the summer, my parents and I have been thinking about me getting a cat just to help with my anxiety. At my school, we have to have a form filled out to confirm that it is not just a recreational pet, it has to be an “assistance animal” that is approved by a therapist or doctor. I originally asked my therapist to sign the forms, and while she agreed it was a good idea, their practice does not do it due to liability reasons (not exactly sure what). I was wondering if you guys would be able to fill out the form for me? I'm more than happy to provide “draft” answers for some of them as they are specific.   If you are willing I can send over the necessary forms.   Thanks so much,  Madeline

## 2024-08-12 DIAGNOSIS — Z00.00 HEALTHCARE MAINTENANCE: ICD-10-CM

## 2024-08-12 PROBLEM — F41.9 ANXIETY: Status: ACTIVE | Noted: 2024-08-12

## 2024-08-16 ENCOUNTER — LAB (OUTPATIENT)
Dept: LAB | Facility: LAB | Age: 19
End: 2024-08-16
Payer: COMMERCIAL

## 2024-08-16 DIAGNOSIS — Z00.00 HEALTHCARE MAINTENANCE: ICD-10-CM

## 2024-08-16 LAB
25(OH)D3 SERPL-MCNC: 40 NG/ML (ref 30–100)
ALBUMIN SERPL BCP-MCNC: 4.3 G/DL (ref 3.4–5)
ALP SERPL-CCNC: 67 U/L (ref 33–110)
ALT SERPL W P-5'-P-CCNC: 16 U/L (ref 7–45)
ANION GAP SERPL CALC-SCNC: 13 MMOL/L (ref 10–20)
APPEARANCE UR: CLEAR
AST SERPL W P-5'-P-CCNC: 16 U/L (ref 9–39)
BASOPHILS # BLD AUTO: 0.06 X10*3/UL (ref 0–0.1)
BASOPHILS NFR BLD AUTO: 0.9 %
BILIRUB SERPL-MCNC: 0.4 MG/DL (ref 0–1.2)
BILIRUB UR STRIP.AUTO-MCNC: NEGATIVE MG/DL
BUN SERPL-MCNC: 8 MG/DL (ref 6–23)
CALCIUM SERPL-MCNC: 9.8 MG/DL (ref 8.6–10.6)
CHLORIDE SERPL-SCNC: 102 MMOL/L (ref 98–107)
CHOLEST SERPL-MCNC: 254 MG/DL (ref 0–199)
CHOLESTEROL/HDL RATIO: 3.8
CO2 SERPL-SCNC: 25 MMOL/L (ref 21–32)
COLOR UR: NORMAL
CREAT SERPL-MCNC: 0.79 MG/DL (ref 0.5–1.05)
CRP SERPL HS-MCNC: 1.3 MG/L
EGFRCR SERPLBLD CKD-EPI 2021: >90 ML/MIN/1.73M*2
EOSINOPHIL # BLD AUTO: 0.27 X10*3/UL (ref 0–0.7)
EOSINOPHIL NFR BLD AUTO: 4.1 %
ERYTHROCYTE [DISTWIDTH] IN BLOOD BY AUTOMATED COUNT: 17.7 % (ref 11.5–14.5)
EST. AVERAGE GLUCOSE BLD GHB EST-MCNC: 123 MG/DL
GLUCOSE SERPL-MCNC: 89 MG/DL (ref 74–99)
GLUCOSE UR STRIP.AUTO-MCNC: NORMAL MG/DL
HBA1C MFR BLD: 5.9 %
HCT VFR BLD AUTO: 36.2 % (ref 36–46)
HDLC SERPL-MCNC: 66.7 MG/DL
HGB BLD-MCNC: 10.7 G/DL (ref 12–16)
HOLD SPECIMEN: NORMAL
IMM GRANULOCYTES # BLD AUTO: 0.01 X10*3/UL (ref 0–0.7)
IMM GRANULOCYTES NFR BLD AUTO: 0.2 % (ref 0–0.9)
KETONES UR STRIP.AUTO-MCNC: NEGATIVE MG/DL
LDLC SERPL CALC-MCNC: 163 MG/DL
LEUKOCYTE ESTERASE UR QL STRIP.AUTO: NEGATIVE
LYMPHOCYTES # BLD AUTO: 3.24 X10*3/UL (ref 1.2–4.8)
LYMPHOCYTES NFR BLD AUTO: 49.8 %
MCH RBC QN AUTO: 21.8 PG (ref 26–34)
MCHC RBC AUTO-ENTMCNC: 29.6 G/DL (ref 32–36)
MCV RBC AUTO: 74 FL (ref 80–100)
MONOCYTES # BLD AUTO: 0.43 X10*3/UL (ref 0.1–1)
MONOCYTES NFR BLD AUTO: 6.6 %
NEUTROPHILS # BLD AUTO: 2.5 X10*3/UL (ref 1.2–7.7)
NEUTROPHILS NFR BLD AUTO: 38.4 %
NITRITE UR QL STRIP.AUTO: NEGATIVE
NON HDL CHOLESTEROL: 187 MG/DL (ref 0–119)
NRBC BLD-RTO: 0 /100 WBCS (ref 0–0)
PH UR STRIP.AUTO: 8 [PH]
PLATELET # BLD AUTO: 304 X10*3/UL (ref 150–450)
POTASSIUM SERPL-SCNC: 4.3 MMOL/L (ref 3.5–5.3)
PROT SERPL-MCNC: 7.7 G/DL (ref 6.4–8.2)
PROT UR STRIP.AUTO-MCNC: NEGATIVE MG/DL
RBC # BLD AUTO: 4.91 X10*6/UL (ref 4–5.2)
RBC # UR STRIP.AUTO: NEGATIVE /UL
SODIUM SERPL-SCNC: 136 MMOL/L (ref 136–145)
SP GR UR STRIP.AUTO: 1.02
TRIGL SERPL-MCNC: 124 MG/DL (ref 0–149)
TSH SERPL-ACNC: 1.85 MIU/L (ref 0.44–3.98)
UROBILINOGEN UR STRIP.AUTO-MCNC: NORMAL MG/DL
VLDL: 25 MG/DL (ref 0–40)
WBC # BLD AUTO: 6.5 X10*3/UL (ref 4.4–11.3)

## 2024-08-16 PROCEDURE — 36415 COLL VENOUS BLD VENIPUNCTURE: CPT

## 2024-08-19 ENCOUNTER — APPOINTMENT (OUTPATIENT)
Dept: PRIMARY CARE | Facility: CLINIC | Age: 19
End: 2024-08-19
Payer: COMMERCIAL

## 2024-08-20 ENCOUNTER — APPOINTMENT (OUTPATIENT)
Dept: PRIMARY CARE | Facility: CLINIC | Age: 19
End: 2024-08-20
Payer: COMMERCIAL

## 2024-08-20 DIAGNOSIS — Z00.01 ENCOUNTER FOR GENERAL ADULT MEDICAL EXAMINATION WITH ABNORMAL FINDINGS: Primary | ICD-10-CM

## 2024-08-20 DIAGNOSIS — R73.03 PREDIABETES: ICD-10-CM

## 2024-08-20 DIAGNOSIS — E61.1 IRON DEFICIENCY: ICD-10-CM

## 2024-08-20 DIAGNOSIS — R71.8 RBC MICROCYTOSIS: ICD-10-CM

## 2024-08-20 DIAGNOSIS — F41.9 ANXIETY: ICD-10-CM

## 2024-08-20 DIAGNOSIS — E78.00 ELEVATED LDL CHOLESTEROL LEVEL: ICD-10-CM

## 2024-08-20 LAB
IRON SATN MFR SERPL: ABNORMAL %
IRON SERPL-MCNC: 35 UG/DL (ref 35–150)
TIBC SERPL-MCNC: ABNORMAL UG/DL
UIBC SERPL-MCNC: >450 UG/DL (ref 110–370)

## 2024-08-22 NOTE — PROGRESS NOTES
Subjective   Patient ID: Edie Verdugo is a 18 y.o. female who presents for Follow-up.  HPI  Unable TCI for annual exam   Still dealing with some issues around anxiety  Had requested form to be completed for therapy pet (cat)  She has been taking medication (duloxetine) after trial of sertraline that has helped reduce some of her anxiety  Also has had some GI issues likely related to this which have been helped with use of probiotics a more regular basis  Some history of iron deficiency which is probably complicated the above as well  Has taken and tolerated some iron supplementation but not currently doing so  Recent lab tests do reveal significantly low hemoglobin as well as smaller and paler red cells consistent with iron deficient state  Also some dyslipidemia with significantly elevated LDL to 163  Denies significant intake of high-fat high cholesterol foods - however, also has elevated A1c test into the prediabetic range slightly higher than last year but probably not a significant increase    Review of Systems  Essentially noncontributory otherwise    Objective   VS not obtained as visit done by video  Physical Exam  General: No acute distress, appears well, no pressured speech or markedly depressed affect  Skin: No marked pallor    Assessment/Plan   Problem List Items Addressed This Visit       Iron deficiency    Anxiety     Other Visit Diagnoses       Encounter for general adult medical examination with abnormal findings    -  Primary    RBC microcytosis        Relevant Orders    Iron and TIBC (Completed)    Prediabetes        Elevated LDL cholesterol level            1) RBC microcytosis and low iron - get back to FeSO4 325 mg daily    2) anxiety - did process form for support pet - cont med - enc activity and socialization    3) elev lipids and prediabetes - enc healthy lifestyle choices and we will continue to follow

## 2024-08-27 DIAGNOSIS — J45.40 MODERATE PERSISTENT ASTHMA WITHOUT COMPLICATION (HHS-HCC): ICD-10-CM

## 2024-08-27 RX ORDER — MOMETASONE FUROATE AND FORMOTEROL FUMARATE DIHYDRATE 100; 5 UG/1; UG/1
2 AEROSOL RESPIRATORY (INHALATION)
Qty: 13 G | Refills: 0 | Status: SHIPPED | OUTPATIENT
Start: 2024-08-27

## 2024-09-27 ENCOUNTER — TELEPHONE (OUTPATIENT)
Dept: OTOLARYNGOLOGY | Facility: CLINIC | Age: 19
End: 2024-09-27
Payer: COMMERCIAL

## 2024-09-27 NOTE — TELEPHONE ENCOUNTER
Message left to call the office back.  Dr. Caldera referral possible tonsillectomy.  Ok per Dr. Smith to do a telehealth

## 2024-09-28 DIAGNOSIS — J45.30 MILD PERSISTENT ASTHMA WITHOUT COMPLICATION (HHS-HCC): ICD-10-CM

## 2024-09-30 RX ORDER — MONTELUKAST SODIUM 10 MG/1
TABLET ORAL
Qty: 30 TABLET | Refills: 0 | Status: SHIPPED | OUTPATIENT
Start: 2024-09-30

## 2024-10-01 ENCOUNTER — OFFICE VISIT (OUTPATIENT)
Dept: OTOLARYNGOLOGY | Facility: CLINIC | Age: 19
End: 2024-10-01
Payer: COMMERCIAL

## 2024-10-01 VITALS — WEIGHT: 173 LBS | HEIGHT: 66 IN | BODY MASS INDEX: 27.8 KG/M2

## 2024-10-01 DIAGNOSIS — J34.3 NASAL TURBINATE HYPERTROPHY: ICD-10-CM

## 2024-10-01 DIAGNOSIS — J03.91 ACUTE RECURRENT TONSILLITIS: Primary | ICD-10-CM

## 2024-10-01 DIAGNOSIS — J30.9 ALLERGIC RHINITIS, UNSPECIFIED SEASONALITY, UNSPECIFIED TRIGGER: ICD-10-CM

## 2024-10-01 DIAGNOSIS — J34.2 DEVIATED NASAL SEPTUM: ICD-10-CM

## 2024-10-01 PROCEDURE — 3008F BODY MASS INDEX DOCD: CPT | Performed by: OTOLARYNGOLOGY

## 2024-10-01 PROCEDURE — 99203 OFFICE O/P NEW LOW 30 MIN: CPT | Performed by: OTOLARYNGOLOGY

## 2024-10-01 NOTE — PROGRESS NOTES
Chief Complaint   Patient presents with    Enlarged Tonsils     NP- ENLARGED TONSILS      Date of Evaluation: 10/1/2024   EDSON Verdugo is a 18 y.o. female here for evaluation of her tonsils.  She is currently in college in [place].  Over the past year and a half she has had recurrent left tonsillitis about 5 times.  The tonsils have remained somewhat enlarged.  She has a longstanding difficulty breathing through the nose right side worse than left.  She does have allergic rhinitis and asthma.  She uses Flonase.  She is snoring at night and frequently has a sore throat in the morning.  She has not had tonsillar stones.       Past Medical History:   Diagnosis Date    Allergic rhinitis, unspecified 01/11/2021    Allergic rhinitis    Atopic dermatitis, unspecified 01/26/2018    Atopic dermatitis    Dysmenorrhea, unspecified 11/02/2020    Dysmenorrhea    Moderate persistent asthma, uncomplicated ([facility]) 03/28/2022    Moderate persistent asthma without complication    Personal history of other infectious and parasitic diseases 07/25/2016    History of molluscum contagiosum    Urticaria, unspecified 05/19/2016    Hives of unknown origin      History reviewed. No pertinent surgical history.       Medications:   Current Outpatient Medications   Medication Instructions    Aerochamber Plus Flow-Vu inhaler USE AS DIRECTED    albuterol 2.5 mg /3 mL (0.083 %) nebulizer solution USE 1 UNIT DOSE (1 VIAL) IN NEBULIZER EVERY 4-6 HOURS AS NEEDED FOR WHEEZING .    Dulera 100-5 mcg/actuation inhaler     Dulera 100-5 mcg/actuation inhaler 2 puffs, inhalation, 2 times daily RT, And 2 puffs every 4 hours as needed for cough, wheeze, shortness of breath. Max 12 puffs in 24 hours.    Dulera 100-5 mcg/actuation inhaler 2 puffs, inhalation, 2 times daily RT, And every 4 hours as needed for cough, wheeze, shortness of breath. Max 12 puffs in 24 hours. NEEDS FOLLOW UP FOR ADDITIONAL REFILLS [phone]    DULoxetine (CYMBALTA) 30  "mg, oral, Daily, Do not crush or chew.    fluticasone (Flonase) 50 mcg/actuation nasal spray 1 spray, nasal, Daily    montelukast (Singulair) 10 mg tablet TAKE 1 TABLET BY MOUTH EVERYDAY AT BEDTIME    norgestimate-ethinyl estradioL (Ortho-Cyclen) 0.25-35 mg-mcg tablet 1 tablet, oral, Daily    predniSONE (Deltasone) 10 mg tablet Take up to 5 tablets daily for up to 5 days for asthma flare.        Allergies:  Allergies   Allergen Reactions    Amoxicillin Hives    Tree Nuts Hives    Penicillins Hives and Rash        Physical Exam:  Last Recorded Vitals  Height 1.676 m (5' 6\"), weight 78.5 kg (173 lb).  []General appearance: Well-developed, well-nourished in no acute distress, conversant with normal voice quality    Head/face: No erythema or edema or facial tenderness, and normal facial nerve function bilaterally    External ear: Clear external auditory canals with normal pinnae  Tube status: N/A  Middle ear: Tympanic membranes intact and mobile, middle ears normal.  Tympanic membrane perforation: N/A  Mastoid bowl: N/A  Hearing: Normal conversational awareness at normal speech thresholds    Nose visualized using: Anterior rhinoscopy  Nasal dorsum: Nontraumatic midline appearance  Septum: Severely deviated towards the right  Inferior turbinates: Normal, pink hypertrophied  Secretions: Dry    Oral cavity and oropharynx: Normal  Teeth: Good condition  Floor of mouth: without lesions  Palate: Normal hard palate, soft palate and uvula  Oropharynx: Clear, no lesions present 2+ tonsils with deep crypts.  Buccal mucosa: Normal without masses or lesions  Lips: Normal    Nasopharynx: Inadequate mirror exam secondary to gag/anatomy    Neck:  Salivary glands: Normal bilateral parotid and submandibular glands by inspection and palpation.  Non-thyroid masses: No palpable masses or significant lymphadenopathy  Trachea: Midline  Thyroid: No thyromegaly or palpable nodules  Temporomandibular joint: Nontender  Cervical range of motion: " Normal    Neurologic exam: Alert and oriented x3, appropriate affect.  Cranial nerves II-XII normal bilaterally  Extraocular movement: Extraocular movement intact, normal gaze alignment        Edie was seen today for enlarged tonsils.  Diagnoses and all orders for this visit:  Acute recurrent tonsillitis (Primary)  Deviated nasal septum  Nasal turbinate hypertrophy  Allergic rhinitis, unspecified seasonality, unspecified trigger       PLAN  We have had a long discussion regarding her recurrent tonsillitis.  I have recommended trying tonsillar lavage twice daily with warm salt water for 3 weeks in an effort to rinse out the tonsillar crypts and hopefully reduce the recurrent tonsillitis.  She does have significant symptomatic nasal obstruction and I believe a septoplasty and turbinate surgery would be of great benefit to her.  She will continue to use Flonase in the meantime.  We have discussed the indications for tonsillectomy which she does not currently meet.  Hopefully she will not have the same frequency of infection this year at school.  I discussed the risks and benefits of septoplasty and turbinate surgery in great detail and she will consider this as an option and call me if she would like to proceed with scheduling    Sebastian Smith MD

## 2024-11-25 DIAGNOSIS — J45.40 MODERATE PERSISTENT ASTHMA WITHOUT COMPLICATION (HHS-HCC): ICD-10-CM

## 2024-11-25 RX ORDER — MOMETASONE FUROATE AND FORMOTEROL FUMARATE DIHYDRATE 100; 5 UG/1; UG/1
2 AEROSOL RESPIRATORY (INHALATION)
Qty: 13 G | Refills: 0 | OUTPATIENT
Start: 2024-11-25

## 2024-11-26 DIAGNOSIS — J45.40 MODERATE PERSISTENT ASTHMA WITHOUT COMPLICATION (HHS-HCC): ICD-10-CM

## 2024-11-26 RX ORDER — MOMETASONE FUROATE AND FORMOTEROL FUMARATE DIHYDRATE 100; 5 UG/1; UG/1
2 AEROSOL RESPIRATORY (INHALATION)
Qty: 13 G | Refills: 3 | Status: SHIPPED | OUTPATIENT
Start: 2024-11-26

## 2025-01-27 DIAGNOSIS — J45.30 MILD PERSISTENT ASTHMA WITHOUT COMPLICATION (HHS-HCC): ICD-10-CM

## 2025-01-27 RX ORDER — MONTELUKAST SODIUM 10 MG/1
10 TABLET ORAL NIGHTLY
Qty: 30 TABLET | Refills: 0 | Status: SHIPPED | OUTPATIENT
Start: 2025-01-27

## 2025-02-19 ENCOUNTER — APPOINTMENT (OUTPATIENT)
Dept: OTOLARYNGOLOGY | Facility: CLINIC | Age: 20
End: 2025-02-19
Payer: COMMERCIAL

## 2025-02-19 DIAGNOSIS — J34.3 NASAL TURBINATE HYPERTROPHY: ICD-10-CM

## 2025-02-19 DIAGNOSIS — J03.91 ACUTE RECURRENT TONSILLITIS: ICD-10-CM

## 2025-02-19 DIAGNOSIS — J34.2 DEVIATED NASAL SEPTUM: Primary | ICD-10-CM

## 2025-02-19 DIAGNOSIS — J30.9 ALLERGIC RHINITIS, UNSPECIFIED SEASONALITY, UNSPECIFIED TRIGGER: ICD-10-CM

## 2025-02-19 PROCEDURE — 99213 OFFICE O/P EST LOW 20 MIN: CPT | Performed by: OTOLARYNGOLOGY

## 2025-02-19 NOTE — PROGRESS NOTES
No chief complaint on file.     Date of Evaluation: 2/19/2025   HPI  Today I did a televisit with video and audio with the patient and her mother.  They had questions about septoplasty and turbinate surgery.  We had discussed this as a treatment for her nasal obstruction.      Edie Verdugo is a 19 y.o. female here for evaluation of her tonsils.  She is currently in college in [place].  Over the past year and a half she has had recurrent left tonsillitis about 5 times.  The tonsils have remained somewhat enlarged.  She has a longstanding difficulty breathing through the nose right side worse than left.  She does have allergic rhinitis and asthma.  She uses Flonase.  She is snoring at night and frequently has a sore throat in the morning.  She has not had tonsillar stones.       Past Medical History:   Diagnosis Date    Allergic rhinitis, unspecified 01/11/2021    Allergic rhinitis    Atopic dermatitis, unspecified 01/26/2018    Atopic dermatitis    Dysmenorrhea, unspecified 11/02/2020    Dysmenorrhea    Moderate persistent asthma, uncomplicated ([facility]) 03/28/2022    Moderate persistent asthma without complication    Personal history of other infectious and parasitic diseases 07/25/2016    History of molluscum contagiosum    Urticaria, unspecified 05/19/2016    Hives of unknown origin      No past surgical history on file.       Medications:   Current Outpatient Medications   Medication Instructions    Aerochamber Plus Flow-Vu inhaler USE AS DIRECTED    albuterol 2.5 mg /3 mL (0.083 %) nebulizer solution USE 1 UNIT DOSE (1 VIAL) IN NEBULIZER EVERY 4-6 HOURS AS NEEDED FOR WHEEZING .    Dulera 100-5 mcg/actuation inhaler 2 puffs, inhalation, 2 times daily RT, And 2 puffs every 4 hours as needed for cough, wheeze, shortness of breath. Max 12 puffs in 24 hours.    DULoxetine (CYMBALTA) 30 mg, oral, Daily, Do not crush or chew.    fluticasone (Flonase) 50 mcg/actuation nasal spray 1 spray, nasal, Daily     montelukast (SINGULAIR) 10 mg, oral, Nightly    norgestimate-ethinyl estradioL (Ortho-Cyclen) 0.25-35 mg-mcg tablet 1 tablet, oral, Daily    predniSONE (Deltasone) 10 mg tablet Take up to 5 tablets daily for up to 5 days for asthma flare.        Allergies:  Allergies   Allergen Reactions    Amoxicillin Hives    Tree Nuts Hives    Penicillins Hives and Rash        Physical Exam:  Last Recorded Vitals  There were no vitals taken for this visit.  []General appearance: Well-developed, well-nourished in no acute distress, conversant with normal voice quality    Head/face: No erythema or edema or facial tenderness, and normal facial nerve function bilaterally    External ear: Clear external auditory canals with normal pinnae  Tube status: N/A  Middle ear: Tympanic membranes intact and mobile, middle ears normal.  Tympanic membrane perforation: N/A  Mastoid bowl: N/A  Hearing: Normal conversational awareness at normal speech thresholds    Nose visualized using: Anterior rhinoscopy  Nasal dorsum: Nontraumatic midline appearance  Septum: Severely deviated towards the right  Inferior turbinates: Normal, pink hypertrophied  Secretions: Dry    Oral cavity and oropharynx: Normal  Teeth: Good condition  Floor of mouth: without lesions  Palate: Normal hard palate, soft palate and uvula  Oropharynx: Clear, no lesions present 2+ tonsils with deep crypts.  Buccal mucosa: Normal without masses or lesions  Lips: Normal    Nasopharynx: Inadequate mirror exam secondary to gag/anatomy    Neck:  Salivary glands: Normal bilateral parotid and submandibular glands by inspection and palpation.  Non-thyroid masses: No palpable masses or significant lymphadenopathy  Trachea: Midline  Thyroid: No thyromegaly or palpable nodules  Temporomandibular joint: Nontender  Cervical range of motion: Normal    Neurologic exam: Alert and oriented x3, appropriate affect.  Cranial nerves II-XII normal bilaterally  Extraocular movement: Extraocular movement  intact, normal gaze alignment        Diagnoses and all orders for this visit:  Deviated nasal septum (Primary)  Nasal turbinate hypertrophy  Acute recurrent tonsillitis  Allergic rhinitis, unspecified seasonality, unspecified trigger         PLAN  Again I reiterated that her tonsillitis issues are a separate issue.  We discussed septoplasty and turbinate surgery in great detail.  I answered all of her and her mother's questions.  I described the surgery in detail including the risks of bleeding infection septal perforation etc.  They will consider scheduling for this summer when she is out of school    Sebastian Smith MD

## 2025-02-26 DIAGNOSIS — J45.30 MILD PERSISTENT ASTHMA WITHOUT COMPLICATION (HHS-HCC): ICD-10-CM

## 2025-02-26 RX ORDER — MONTELUKAST SODIUM 10 MG/1
10 TABLET ORAL NIGHTLY
Qty: 90 TABLET | Refills: 1 | Status: SHIPPED | OUTPATIENT
Start: 2025-02-26

## 2025-03-13 ENCOUNTER — APPOINTMENT (OUTPATIENT)
Dept: PRIMARY CARE | Facility: CLINIC | Age: 20
End: 2025-03-13
Payer: COMMERCIAL

## 2025-03-14 ENCOUNTER — TELEMEDICINE (OUTPATIENT)
Dept: PRIMARY CARE | Facility: CLINIC | Age: 20
End: 2025-03-14
Payer: COMMERCIAL

## 2025-03-14 DIAGNOSIS — F41.9 ANXIETY: Primary | ICD-10-CM

## 2025-03-14 DIAGNOSIS — J45.40 MODERATE PERSISTENT ASTHMA WITHOUT COMPLICATION (HHS-HCC): ICD-10-CM

## 2025-03-14 PROCEDURE — 99214 OFFICE O/P EST MOD 30 MIN: CPT | Performed by: FAMILY MEDICINE

## 2025-03-14 RX ORDER — BUSPIRONE HYDROCHLORIDE 5 MG/1
2.5-1 TABLET ORAL 2 TIMES DAILY PRN
Qty: 60 TABLET | Refills: 1 | Status: SHIPPED | OUTPATIENT
Start: 2025-03-14 | End: 2026-03-14

## 2025-03-14 NOTE — PROGRESS NOTES
Done as Virtual Visit    Subjective   Patient ID: Edie Verdugo is a 19 y.o. female who presents for Anxiety and Asthma.  HPI  1) has been having some struggles with heightened anxiety particularly over travel -it is not necessarily worried about having some sort of accident, more so just the nature of even thinking about getting to the airport and such -has tried various methods of distraction without great benefit -has been working with a therapist, who also recommended she download the Teamistopace ricky to use while on the plane - did review that there are various tools on there beyond meditations but even soundtracks of rain or even a dryer running -she has been focusing on breath work as well but not always enough for her - questions if she needs to change from the duloxetine or perhaps even adjust the dose -but discussed the potential use of a as needed medication such as the hydroxyzine she was given by the student health service before -she found it simply too sedating -discussed the role of trying buspirone on an as-needed basis and she seems comfortable with that -she is doing a lot of things that she enjoys and seems to be managing her personal life and school life relatively well -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     2) asthma seems under good control -uses the Dulera just once in the morning -cannot really recall when she last used her albuterol and not even sure where it is at this point    Review of Systems  Essentially noncontributory otherwise    Objective   No VS taken - but RR wnl  Physical Exam  Gen: No acute distress, appears well -no pressured speech or markedly depressed affect -no gross tremulousness - no increased work of breathing or notable wheezing or coughing and did not sound congested  Assessment/Plan   Problem List Items Addressed This Visit       Moderate persistent asthma without complication ([facility])    Anxiety - Primary    Relevant Medications    busPIRone  (Buspar) 5 mg tablet   1) anxiety issues - continue the duloxetine but update me in a week as to how she is managing with the buspirone    2) asthma issues - stable / cpm

## 2025-03-21 DIAGNOSIS — F41.9 ANXIETY: ICD-10-CM

## 2025-03-21 RX ORDER — BUSPIRONE HYDROCHLORIDE 5 MG/1
2.5-1 TABLET ORAL 2 TIMES DAILY PRN
Qty: 360 TABLET | Refills: 1 | Status: SHIPPED | OUTPATIENT
Start: 2025-03-21 | End: 2026-03-21

## 2025-04-02 ENCOUNTER — TELEPHONE (OUTPATIENT)
Dept: PRIMARY CARE | Facility: CLINIC | Age: 20
End: 2025-04-02
Payer: COMMERCIAL

## 2025-04-02 DIAGNOSIS — F41.9 ANXIETY: Primary | ICD-10-CM

## 2025-04-02 RX ORDER — ALPRAZOLAM 0.25 MG/1
.125-.5 TABLET ORAL 2 TIMES DAILY PRN
Qty: 20 TABLET | Refills: 0 | Status: SHIPPED | OUTPATIENT
Start: 2025-04-02

## 2025-04-02 NOTE — TELEPHONE ENCOUNTER
Patient reached out to note that the buspirone has been very helpful for her day-to-day stuff but does have some worries that it may not be as helpful when she has more extreme anxiety when it comes to flying  Discussed having the following around for her travels, but she should experiment with it at least once late in the evening to see how it will affect her before using for travel -encouraged to update us as need be    I have personally reviewed the OARRS report for this patient.  The report is scanned into the EMR. I have considered the risks of abuse, dependence, addiction, and diversion.    Requested Prescriptions     Signed Prescriptions Disp Refills    ALPRAZolam (Xanax) 0.25 mg tablet 20 tablet 0     Sig: Take 0.5-2 tablets (0.125-0.5 mg) by mouth 2 times a day as needed for anxiety.     Authorizing Provider: EMMANUEL VALERO

## 2025-05-05 DIAGNOSIS — J45.40 MODERATE PERSISTENT ASTHMA WITHOUT COMPLICATION (HHS-HCC): ICD-10-CM

## 2025-05-05 RX ORDER — MOMETASONE FUROATE AND FORMOTEROL FUMARATE DIHYDRATE 100; 5 UG/1; UG/1
2 AEROSOL RESPIRATORY (INHALATION)
Qty: 13 G | Refills: 3 | Status: SHIPPED | OUTPATIENT
Start: 2025-05-05

## 2025-05-05 RX ORDER — ETONOGESTREL AND ETHINYL ESTRADIOL VAGINAL RING .015; .12 MG/D; MG/D
1 RING VAGINAL
COMMUNITY
Start: 2023-12-19

## 2025-05-08 DIAGNOSIS — J30.2 ACUTE SEASONAL ALLERGIC RHINITIS: ICD-10-CM

## 2025-05-08 DIAGNOSIS — J45.40 MODERATE PERSISTENT ASTHMA WITHOUT COMPLICATION (HHS-HCC): ICD-10-CM

## 2025-05-08 RX ORDER — ALBUTEROL SULFATE 90 UG/1
2 INHALANT RESPIRATORY (INHALATION) EVERY 6 HOURS PRN
Qty: 18 G | Refills: 1 | Status: SHIPPED | OUTPATIENT
Start: 2025-05-08 | End: 2026-05-08

## 2025-05-08 RX ORDER — ALBUTEROL SULFATE 0.83 MG/ML
2.5 SOLUTION RESPIRATORY (INHALATION) EVERY 12 HOURS PRN
Qty: 75 ML | Refills: 1 | Status: CANCELLED | OUTPATIENT
Start: 2025-05-08 | End: 2025-06-07

## 2025-05-08 RX ORDER — ALBUTEROL SULFATE 0.83 MG/ML
2.5 SOLUTION RESPIRATORY (INHALATION) EVERY 6 HOURS PRN
Qty: 90 ML | Refills: 1 | Status: SHIPPED | OUTPATIENT
Start: 2025-05-08

## 2025-05-08 RX ORDER — PREDNISONE 10 MG/1
TABLET ORAL
Qty: 25 TABLET | Refills: 1 | Status: SHIPPED | OUTPATIENT
Start: 2025-05-08

## 2025-05-08 RX ORDER — ALBUTEROL SULFATE 90 UG/1
2 INHALANT RESPIRATORY (INHALATION) EVERY 6 HOURS PRN
COMMUNITY
End: 2025-05-08 | Stop reason: ENTERED-IN-ERROR

## 2025-05-08 NOTE — TELEPHONE ENCOUNTER
Requested Prescriptions     Signed Prescriptions Disp Refills    predniSONE (Deltasone) 10 mg tablet 25 tablet 1     Sig: Take up to 5 tablets daily for up to 5 days for asthma flare.     Authorizing Provider: EMMANUEL VALERO    albuterol 90 mcg/actuation inhaler 18 g 1     Sig: Inhale 2 puffs every 6 hours if needed for wheezing or shortness of breath (cough).     Authorizing Provider: EMMANUEL VALERO    albuterol 2.5 mg /3 mL (0.083 %) nebulizer solution 90 mL 1     Sig: Take 3 mL (2.5 mg) by nebulization every 6 hours if needed for wheezing or shortness of breath.     Authorizing Provider: EMMANUEL VALERO

## 2025-05-08 NOTE — TELEPHONE ENCOUNTER
Hi again!   I'm going to [place] for a few weeks and was wondering if you could fill the following prescriptions in the event I need them:    Prednisone   Albuterol viles for nebulizer   Extra albuterol inhaler    Thank you!     Madeline Verdugo

## 2025-06-09 ENCOUNTER — APPOINTMENT (OUTPATIENT)
Dept: PEDIATRIC PULMONOLOGY | Facility: CLINIC | Age: 20
End: 2025-06-09
Payer: COMMERCIAL

## 2025-06-09 VITALS
HEIGHT: 66 IN | HEART RATE: 91 BPM | SYSTOLIC BLOOD PRESSURE: 128 MMHG | DIASTOLIC BLOOD PRESSURE: 83 MMHG | WEIGHT: 174.6 LBS | RESPIRATION RATE: 20 BRPM | BODY MASS INDEX: 28.06 KG/M2 | OXYGEN SATURATION: 98 %

## 2025-06-09 DIAGNOSIS — J45.909 ASTHMA IN PEDIATRIC PATIENT, UNSPECIFIED ASTHMA SEVERITY, UNCOMPLICATED (HHS-HCC): ICD-10-CM

## 2025-06-09 DIAGNOSIS — J45.40 MODERATE PERSISTENT ASTHMA WITHOUT COMPLICATION (HHS-HCC): Primary | ICD-10-CM

## 2025-06-09 DIAGNOSIS — J30.9 ALLERGIC RHINITIS, UNSPECIFIED SEASONALITY, UNSPECIFIED TRIGGER: ICD-10-CM

## 2025-06-09 DIAGNOSIS — J38.7 INDUCIBLE LARYNGEAL OBSTRUCTION (ILO): ICD-10-CM

## 2025-06-09 LAB
MGC ASCENT PFT - FEV1 - PRE: 3.07
MGC ASCENT PFT - FEV1 - PREDICTED: 3.27
MGC ASCENT PFT - FVC - PRE: 4.58
MGC ASCENT PFT - FVC - PREDICTED: 3.71

## 2025-06-09 PROCEDURE — 99214 OFFICE O/P EST MOD 30 MIN: CPT | Performed by: PEDIATRICS

## 2025-06-09 PROCEDURE — 3008F BODY MASS INDEX DOCD: CPT | Performed by: PEDIATRICS

## 2025-06-09 NOTE — PROGRESS NOTES
Last visit Assessment and Plan:   Historian: patient  Last seen in clinic: 2023 Dr. Brooks  Last plan: Dulera 100 2 puffs twice a day, albuterol as reliever, singulair 10 mg daily    Interval history:  Goes to school in [place], studying Psychology. Takes inhaler daily with spacer and rarely misses a dose.  If misses Dulera, it is a night dose. Still feels winded if she walks up stairs and has not taken medicine. Or when she wakes up, chest is tight. Wishes she had outgrown this.  Also with activity, working out, she has trouble breathing, Will pre treat with albuterol and doesn't feel like it helps. Needs albuterol during the workout. Upon questioning, she says it is 'hard to get air in.' Happens pretty quickly into the workout. Points to lower throat.    Having sinus and nasal surgery in two days. Snores a lot. Hoping surgery will fix this.    Used to take Dulera as SMART therapy but then Pcp said not to. So she just keeps both inhaler separate.    Has red zone prednisone on hand but has not used in long time.    Risk assessment:  Hospitalizations: no  ED visits: no  Systemic corticosteroid courses: no    Impairment assessment:  - Symptoms in last 2-4 weeks: twice a week, will use it before working out and still gets chest tightness  - Nocturnal cough: no  - Daytime cough/wheeze: no  - Albuterol frequency: twice a week  - Exercise limitation: limits activities  - Allergy symptoms: pollen  - Snoring: yes, but having surgery  - Pets/smoking exposure: dog and two cats    Past Medical Hx: personally review and no changes unless noted in chart.  Family Hx: personally review and no changes unless noted in chart.  Social Hx: personally review and no changes unless noted in chart.  All other ROS (10 point review) was negative unless noted above.    Current Outpatient Medications   Medication Instructions    Aerochamber Plus Flow-Vu inhaler USE AS DIRECTED    albuterol 90 mcg/actuation inhaler 2 puffs, inhalation,  Every 6 hours PRN    albuterol 2.5 mg, nebulization, Every 6 hours PRN    ALPRAZolam (XANAX) 0.125-0.5 mg, oral, 2 times daily PRN    busPIRone (BUSPAR) 2.5-10 mg, oral, 2 times daily PRN    Dulera 100-5 mcg/actuation inhaler 2 puffs, inhalation, 2 times daily RT, And 2 puffs every 4 hours as needed for cough, wheeze, shortness of breath. Max 12 puffs in 24 hours.    etonogestreL-ethinyl estradioL (Nuvaring) 0.12-0.015 mg/24 hr vaginal ring 1 each, Every 21 days    fluticasone (Flonase) 50 mcg/actuation nasal spray 1 spray, Daily    montelukast (SINGULAIR) 10 mg, oral, Nightly    norgestimate-ethinyl estradioL (Ortho-Cyclen) 0.25-35 mg-mcg tablet 1 tablet, Daily    predniSONE (Deltasone) 10 mg tablet Take up to 5 tablets daily for up to 5 days for asthma flare.       Vitals:    06/09/25 1456   BP: 128/83   Pulse: 91   Resp: 20   SpO2: 98%        Physical Exam:   General: awake and alert no distress  Neuro: alert, oriented, interactive  Eyes: clear, no conjunctival injection or discharge  Ears: Left and Right TM clear with good light reflex and landmarks  Nose: no drainage, turbinates non-erythematous and non-edematous in appearance  Mouth: MMM no lesions, posterior oropharynx without exudates, tonsils 1+  Neck: no lymphadenopathy  Heart: RRR nml S1/S2, no m/r/g noted, cap refill <2 sec  Lungs: Normal respiratory rate, chest with normal A-P diameter, no chest wall deformities. Lungs are CTA B/L. No wheezes, crackles, rhonchi. No cough observed on exam  Skin: warm and without rashes on exposed skin  MSK: normal muscle tone, TOVAR  Ext: no cyanosis, no digital clubbing    I personally reviewed previous documentation, any new pertinent labs, and new pertinent radiologic imaging.     Assessment:  1. Moderate persistent asthma without complication ([facility])        2. Allergic rhinitis, unspecified seasonality, unspecified trigger        3. Inducible laryngeal obstruction (ILO)          Despite good technique and adherence,  Edie still experiencing chest tightness upon waking up. She limits her physical activities as well.   She may also have a component of vocal cord dysfunction since she has quick onset of trouble breathing in when active.     Plan:  1. Step up to Dulera 100 two puffs BID and SMART for quick relief  2. Continue Singulair daily  3. Speech therapy for VC exercises  4. Followup at [facility] or [facility]- whatever is easier to do with coming home from college.      - Use albuterol either by nebulizer or inhaler with spacer every 4 hours as needed for cough, wheeze, or difficulty breathing  - Personalized asthma action plan was provided and reviewed.  Please call pediatric triage line if in Yellow Zone for more than 24 hours or if in Red Zone.  - Inhaled medication delivery device techniques were reviewed at this visit.  - Patient engagement using teach back during review of devices or action plan was utilized  - Flu vaccine yearly in the fall   - Smoking cessation for all appropriate family members

## 2025-06-09 NOTE — PATIENT INSTRUCTIONS
VOCAL CORD DYSFUNCTION      WHAT IS VOCAL CORD DYSFUNCTION?     Vocal cord dysfunction (VCD) is also known as inducible laryngeal obstruction (ILO). It is also called paradoxical vocal fold motion (PVFM).  VCD is a condition that causes breathing problems because of abnormal closure of the vocal cords.  The vocal cords are located in the voice-box in the throat.  Normally, when you breathe in (inhale), the vocal cords open, allowing air to flow into your windpipe (trachea) and reach your lungs. However, with VCD, the vocal cords close together (constrict) when you inhale. This leaves only a small opening for air to flow into your windpipe.  Air has difficulty passing through the small opening between the closed vocal cords and this causes breathing difficulty.        SYMPTOMS AND SIGNS OF VCD     VCD can cause difficulty breathing, wheezing (whistling sound when breathing), stridor (harsh gasping noise when breathing), chest pain, chest tightness, throat tightness, “difficulty getting air in” cough and / or asthma-like symptoms.    Children with Vocal Cord Dysfunction usually cannot control the vocal cords to make the symptoms start or stop.    Sudden and sometimes severe attacks may occur.    Many children with VCD may be diagnosed with asthma and treated with asthma medications. Since VCD is not asthma, the symptoms do not improve with this treatment.    When VCD is not treated properly, it may lead to frequent emergency room visits and hospitalizations.    Some Children have both asthma and VCD  A confusing fact is that some children with asthma also have VCD.  When a person with both VCD and asthma starts to cough, wheeze, or have trouble breathing it can be difficult to quickly determine if the symptoms are from asthma, VCD, or both at the same time.        DIAGNOSIS OF VCD    Diagnosis of VCD can be very difficult.    Your physician must ask many questions about the symptoms.  A specific breathing test called  a flow volume loop can be helpful in showing VCD, especially if the breathing test is done while symptoms are happening.    A procedure called a laryngoscopy performed by a specialist is sometimes needed to confirm the diagnosis of VCD.   Using a flexible tube, the specialist can see how the vocal cords open and close while you are having symptoms.     Exercise or an inhaled medicine called methacholine might be used to trigger symptoms so that the specialist can perform the flow volume loop and / or laryngoscopy when you are having symptoms.         TRIGGERS OF VCD    Triggers may include colds, fumes, odors, cigarette smoke, singing, emotional upset, post-nasal drip and exercise.  Sometimes the trigger is not known.  Athletes may develop VCD during training or competition        TREATMENT OF VCD    If VCD is the only condition, then asthma medications may be stopped. If your child has BOTH asthma and VCD, asthma medications may be continued, but may often be decreased.    Therapy by a psychologist and / or speech pathologist is the most important part of the treatment for VCD. Special exercises increase awareness of abdominal breathing and relax the throat muscles.    The exercises allow the throat and vocal cords to be controlled and overcome the abnormal vocal cord movements.     Another important part of therapy is supportive counseling to learn stress management techniques since stress may be a factor in VCD.    Three or four meetings with the therapist are usually required to learn these techniques   Learning the techniques requires regular practice--even when VCD symptoms are not present, so that the techniques will be applied skillfully when VCD symptoms do occur   Acid suppression medication can sometimes help children with VCD who have acid reflux that brings on or worsens the VCD symptoms.   To Learn More:  [email or web address]/vocal-cord-dysfunction-vcd        REFERRALS FOR  VCD    [facility]:   Speech pathologists: Socorro Stock, Breanna Lema, Chantal Moncada   [facility]   Humaira Adler or Paramjit Stockton SLP, phone [phone]    Kelly Fitzgerald M.A, [facility]-SLP, Speech and Audiology Department, [facility], [address] [facility], [phone]  [facility] speech/language pathologists (SLP). That number is: [phone]     [facility]   Enma Gee or Iraj Toure: ([phone]) Division of Pediatric Psychology    Zora Justin or one of her partners in Department of Speech/Rehab, Phone: [phone].  Fax: [phone]   [facility]   Valerie / Karla Tomlin: pediatric Behavioral Medicine and Psychology, phone [phone]     Cecil Causey and Pop Mustafa: Speech Therapy ([facility] on 7th floor of [facility]): Phone: [phone] or office [phone]   When calling for Jazmín Roche/Nadir, they can tell our scheduling team they need a visit with either doc for VCD or ILO.    Scheduling for Dr. Schmitz and Dr. Roche: [phone]   Both our voice teams (Humaira/Dr. Schmitz & Paramjit/Dr. Roche) conduct clinic at [facility] in [place] and [facility] in the [facility]. Dr. Roche and I also run a clinic in [place] every other Tuesday.    For their continued therapy, they can be seen in person or virtually pending patient preference. Humaira and Paramjit conduct therapy at [facility], [facility] and [place]. Respiratory training is typically once per week for 4 weeks but can range pending patient needs.

## 2025-06-10 DIAGNOSIS — Z00.00 HEALTHCARE MAINTENANCE: ICD-10-CM

## 2025-06-10 RX ORDER — ALBUTEROL SULFATE 90 UG/1
2 INHALANT RESPIRATORY (INHALATION) EVERY 4 HOURS PRN
Qty: 18 G | Refills: 6 | Status: SHIPPED | OUTPATIENT
Start: 2025-06-10 | End: 2026-06-10

## 2025-06-10 RX ORDER — MOMETASONE FUROATE AND FORMOTEROL FUMARATE DIHYDRATE 100; 5 UG/1; UG/1
2 AEROSOL RESPIRATORY (INHALATION)
Qty: 26 G | Refills: 6 | Status: SHIPPED | OUTPATIENT
Start: 2025-06-10

## 2025-06-10 RX ORDER — MONTELUKAST SODIUM 10 MG/1
10 TABLET ORAL NIGHTLY
Qty: 90 TABLET | Refills: 3 | Status: SHIPPED | OUTPATIENT
Start: 2025-06-10

## 2025-06-11 ENCOUNTER — DOCUMENTATION (OUTPATIENT)
Dept: OTOLARYNGOLOGY | Facility: HOSPITAL | Age: 20
End: 2025-06-11
Payer: COMMERCIAL

## 2025-06-11 DIAGNOSIS — G89.18 POST-OPERATIVE PAIN: Primary | ICD-10-CM

## 2025-06-11 RX ORDER — HYDROCODONE BITARTRATE AND ACETAMINOPHEN 5; 325 MG/1; MG/1
1 TABLET ORAL EVERY 6 HOURS PRN
Qty: 15 TABLET | Refills: 0 | Status: SHIPPED | OUTPATIENT
Start: 2025-06-11 | End: 2025-06-18

## 2025-06-11 NOTE — PROGRESS NOTES
OP NOTE     Date: 2025  OR Location: [facility]    Name: Edie Verdugo : 2005 Age: 19 y.o. MRN: [id number]  Sex: female      Diagnosis  Pre-op Diagnosis  Deviated nasal septum and turbinate hypertrophy Post-op Diagnosis     Same     Procedures  Septoplasty  Bilateral submucous resection of the inferior turbinates  Bilateral outfracture of the inferior turbinates    Surgeon:      Sebastian Smith MD       Procedure Summary  Anesthesia: General  Specimen:   none    Operative indication:   Edie Verdugo is a 19 y.o. female with chronic nasal obstruction with a deviated nasal septum and bilateral inferior turbinate hypertrophy.  She has chronic mouth breathing and recurrent tonsillitis.    Operative report: The patient was brought to the operating room and placed on the operating table in the supine position.  After blood pressure and cardiac monitors were applied the patient was placed under general anesthesia and orally intubated.  The nasal cavity was decongested with cottonoid pledgets soaked in Kyle-Synephrine.  1% lidocaine with 1 100,000 epinephrine was infiltrated into the nasal septum and lateral nasal wall bilaterally.  After allowing for vasoconstriction a hemitransfixion incision was made in the left caudal aspect of the septum.  The mucoperichondrium/mucoperiosteum was elevated off of the left side of the septum.  The bony cartilaginous junction was identified.  An incision was made just anterior to this junction.  The right sided posterior mucoperiosteum was then elevated off of the posterior septum.  Numerous deformities were resected from the posterior bony septum.  The posterior inferior aspect of the quadrangular cartilage was trimmed.  The maxillary crest was lowered.  The quadrangular cartilage was rotated back into midline and now the septum was straight and nonobstructing.  The hemitransfixion incision was closed with 4-0 chromic suture.  Several transseptal quilting sutures were  passed with 4-0 chromic suture.  Attention was then directed to the inferior turbinates.  They were infiltrated with saline.  An incision was made in the anterior aspect of the inferior turbinate and a submucosal plane was elevated with a ninoska elevator.  The microdebrider with coagulation was then used to perform submucous resection.  This was carried out bilaterally.  The inferior turbinates were then outfractured bilaterally.  Silastic splints were cut and placed onto each side of the nasal septum and sewn in place with a Prolene suture.  The patient was allowed to awaken from anesthesia and was extubated in the operating room.  He was transported to the postanesthesia care unit in stable condition having tolerated the procedure well.  There were no complications.    Findings: Severely deviated nasal septum towards the right    Complications: None    Estimated blood loss: Minimal    Disposition: PACU      06/11/25 at 10:55 AM - Sebastian Smith MD

## 2025-06-16 ASSESSMENT — PROMIS GLOBAL HEALTH SCALE
RATE_AVERAGE_PAIN: 0
RATE_PHYSICAL_HEALTH: VERY GOOD
EMOTIONAL_PROBLEMS: OFTEN
RATE_GENERAL_HEALTH: VERY GOOD
RATE_AVERAGE_FATIGUE: MILD
RATE_QUALITY_OF_LIFE: EXCELLENT
CARRYOUT_SOCIAL_ACTIVITIES: EXCELLENT
RATE_MENTAL_HEALTH: GOOD
CARRYOUT_PHYSICAL_ACTIVITIES: COMPLETELY
RATE_SOCIAL_SATISFACTION: VERY GOOD

## 2025-06-17 ENCOUNTER — APPOINTMENT (OUTPATIENT)
Dept: OTOLARYNGOLOGY | Facility: CLINIC | Age: 20
End: 2025-06-17
Payer: COMMERCIAL

## 2025-06-17 ENCOUNTER — LAB (OUTPATIENT)
Dept: LAB | Facility: HOSPITAL | Age: 20
End: 2025-06-17
Payer: COMMERCIAL

## 2025-06-17 VITALS — WEIGHT: 174 LBS | BODY MASS INDEX: 27.97 KG/M2 | HEIGHT: 66 IN

## 2025-06-17 DIAGNOSIS — J34.3 NASAL TURBINATE HYPERTROPHY: ICD-10-CM

## 2025-06-17 DIAGNOSIS — J34.2 DEVIATED NASAL SEPTUM: Primary | ICD-10-CM

## 2025-06-17 DIAGNOSIS — J30.9 ALLERGIC RHINITIS, UNSPECIFIED SEASONALITY, UNSPECIFIED TRIGGER: ICD-10-CM

## 2025-06-17 DIAGNOSIS — Z00.00 ENCOUNTER FOR GENERAL ADULT MEDICAL EXAMINATION WITHOUT ABNORMAL FINDINGS: Primary | ICD-10-CM

## 2025-06-17 LAB
25(OH)D3 SERPL-MCNC: 44 NG/ML (ref 30–100)
ALBUMIN SERPL BCP-MCNC: 4.6 G/DL (ref 3.4–5)
ALP SERPL-CCNC: 73 U/L (ref 33–110)
ALT SERPL W P-5'-P-CCNC: 12 U/L (ref 7–45)
ANION GAP SERPL CALC-SCNC: 15 MMOL/L (ref 10–20)
APPEARANCE UR: CLEAR
AST SERPL W P-5'-P-CCNC: 12 U/L (ref 9–39)
BASOPHILS # BLD AUTO: 0.07 X10*3/UL (ref 0–0.1)
BASOPHILS NFR BLD AUTO: 0.6 %
BILIRUB SERPL-MCNC: 0.3 MG/DL (ref 0–1.2)
BILIRUB UR STRIP.AUTO-MCNC: NEGATIVE MG/DL
BUN SERPL-MCNC: 9 MG/DL (ref 6–23)
CALCIUM SERPL-MCNC: 10.4 MG/DL (ref 8.6–10.6)
CHLORIDE SERPL-SCNC: 102 MMOL/L (ref 98–107)
CHOLEST SERPL-MCNC: 244 MG/DL (ref 0–199)
CHOLESTEROL/HDL RATIO: 4.4
CO2 SERPL-SCNC: 23 MMOL/L (ref 21–32)
COLOR UR: NORMAL
CREAT SERPL-MCNC: 0.75 MG/DL (ref 0.5–1.05)
CRP SERPL HS-MCNC: 8.9 MG/L
EGFRCR SERPLBLD CKD-EPI 2021: >90 ML/MIN/1.73M*2
EOSINOPHIL # BLD AUTO: 0.27 X10*3/UL (ref 0–0.7)
EOSINOPHIL NFR BLD AUTO: 2.3 %
ERYTHROCYTE [DISTWIDTH] IN BLOOD BY AUTOMATED COUNT: 14.9 % (ref 11.5–14.5)
EST. AVERAGE GLUCOSE BLD GHB EST-MCNC: 108 MG/DL
GLUCOSE SERPL-MCNC: 84 MG/DL (ref 74–99)
GLUCOSE UR STRIP.AUTO-MCNC: NORMAL MG/DL
HBA1C MFR BLD: 5.4 % (ref ?–5.7)
HCT VFR BLD AUTO: 38.8 % (ref 36–46)
HDLC SERPL-MCNC: 55 MG/DL
HGB BLD-MCNC: 12 G/DL (ref 12–16)
IMM GRANULOCYTES # BLD AUTO: 0.04 X10*3/UL (ref 0–0.7)
IMM GRANULOCYTES NFR BLD AUTO: 0.3 % (ref 0–0.9)
KETONES UR STRIP.AUTO-MCNC: NEGATIVE MG/DL
LDLC SERPL CALC-MCNC: 140 MG/DL
LEUKOCYTE ESTERASE UR QL STRIP.AUTO: NEGATIVE
LYMPHOCYTES # BLD AUTO: 4.07 X10*3/UL (ref 1.2–4.8)
LYMPHOCYTES NFR BLD AUTO: 35.1 %
MCH RBC QN AUTO: 24.5 PG (ref 26–34)
MCHC RBC AUTO-ENTMCNC: 30.9 G/DL (ref 32–36)
MCV RBC AUTO: 79 FL (ref 80–100)
MONOCYTES # BLD AUTO: 0.77 X10*3/UL (ref 0.1–1)
MONOCYTES NFR BLD AUTO: 6.6 %
NEUTROPHILS # BLD AUTO: 6.39 X10*3/UL (ref 1.2–7.7)
NEUTROPHILS NFR BLD AUTO: 55.1 %
NITRITE UR QL STRIP.AUTO: NEGATIVE
NON HDL CHOLESTEROL: 189 MG/DL (ref 0–149)
NRBC BLD-RTO: 0 /100 WBCS (ref 0–0)
PH UR STRIP.AUTO: 6 [PH]
PLATELET # BLD AUTO: 352 X10*3/UL (ref 150–450)
POTASSIUM SERPL-SCNC: 4.3 MMOL/L (ref 3.5–5.3)
PROT SERPL-MCNC: 7.7 G/DL (ref 6.4–8.2)
PROT UR STRIP.AUTO-MCNC: NEGATIVE MG/DL
RBC # BLD AUTO: 4.89 X10*6/UL (ref 4–5.2)
RBC # UR STRIP.AUTO: NEGATIVE MG/DL
SODIUM SERPL-SCNC: 136 MMOL/L (ref 136–145)
SP GR UR STRIP.AUTO: 1.02
TRIGL SERPL-MCNC: 245 MG/DL (ref 0–114)
TSH SERPL-ACNC: 2.15 MIU/L (ref 0.44–3.98)
UROBILINOGEN UR STRIP.AUTO-MCNC: NORMAL MG/DL
VLDL: 49 MG/DL (ref 0–40)
WBC # BLD AUTO: 11.6 X10*3/UL (ref 4.4–11.3)

## 2025-06-17 PROCEDURE — 80053 COMPREHEN METABOLIC PANEL: CPT

## 2025-06-17 PROCEDURE — 80061 LIPID PANEL: CPT

## 2025-06-17 PROCEDURE — 86141 C-REACTIVE PROTEIN HS: CPT

## 2025-06-17 PROCEDURE — 3008F BODY MASS INDEX DOCD: CPT | Performed by: OTOLARYNGOLOGY

## 2025-06-17 PROCEDURE — 82306 VITAMIN D 25 HYDROXY: CPT

## 2025-06-17 PROCEDURE — 81003 URINALYSIS AUTO W/O SCOPE: CPT

## 2025-06-17 PROCEDURE — 83036 HEMOGLOBIN GLYCOSYLATED A1C: CPT

## 2025-06-17 PROCEDURE — 99024 POSTOP FOLLOW-UP VISIT: CPT | Performed by: OTOLARYNGOLOGY

## 2025-06-17 PROCEDURE — 84443 ASSAY THYROID STIM HORMONE: CPT

## 2025-06-17 PROCEDURE — 83540 ASSAY OF IRON: CPT

## 2025-06-17 PROCEDURE — 85025 COMPLETE CBC W/AUTO DIFF WBC: CPT

## 2025-06-17 PROCEDURE — 36415 COLL VENOUS BLD VENIPUNCTURE: CPT

## 2025-06-17 NOTE — PROGRESS NOTES
Chief Complaint   Patient presents with    Post-op     DOS: 6/11/2025 SEPTO/TURBS      Date of Evaluation: 6/17/2025   HPI  She returns status post septoplasty and turbinate surgery June 11, 2025.  Postoperative course was uneventful  Edie Verdugo is a 19 y.o. female here for evaluation of her tonsils.  She is currently in college in [place].  Over the past year and a half she has had recurrent left tonsillitis about 5 times.  The tonsils have remained somewhat enlarged.  She has a longstanding difficulty breathing through the nose right side worse than left.  She does have allergic rhinitis and asthma.  She uses Flonase.  She is snoring at night and frequently has a sore throat in the morning.  She has not had tonsillar stones.       Past Medical History:   Diagnosis Date    Allergic rhinitis, unspecified 01/11/2021    Allergic rhinitis    Atopic dermatitis, unspecified 01/26/2018    Atopic dermatitis    Dysmenorrhea, unspecified 11/02/2020    Dysmenorrhea    Moderate persistent asthma, uncomplicated ([facility]) 03/28/2022    Moderate persistent asthma without complication    Personal history of other infectious and parasitic diseases 07/25/2016    History of molluscum contagiosum    Urticaria, unspecified 05/19/2016    Hives of unknown origin      History reviewed. No pertinent surgical history.       Medications:   Current Outpatient Medications   Medication Instructions    Aerochamber Plus Flow-Vu inhaler USE AS DIRECTED    albuterol 90 mcg/actuation inhaler 2 puffs, inhalation, Every 4 hours PRN    albuterol 2.5 mg, nebulization, Every 6 hours PRN    ALPRAZolam (XANAX) 0.125-0.5 mg, oral, 2 times daily PRN    busPIRone (BUSPAR) 2.5-10 mg, oral, 2 times daily PRN    Dulera 100-5 mcg/actuation inhaler 2 puffs, inhalation, 2 times daily RT, And 2 puffs every 4 hours as needed for cough, wheeze, shortness of breath. Max 12 puffs in 24 hours.    etonogestreL-ethinyl estradioL (Nuvaring) 0.12-0.015 mg/24 hr  "vaginal ring 1 each, Every 21 days    fluticasone (Flonase) 50 mcg/actuation nasal spray 1 spray, Daily    HYDROcodone-acetaminophen (Norco) 5-325 mg tablet 1 tablet, oral, Every 6 hours PRN    montelukast (SINGULAIR) 10 mg, oral, Nightly    norgestimate-ethinyl estradioL (Ortho-Cyclen) 0.25-35 mg-mcg tablet 1 tablet, Daily    predniSONE (Deltasone) 10 mg tablet Take up to 5 tablets daily for up to 5 days for asthma flare.        Allergies:  Allergies   Allergen Reactions    Amoxicillin Hives    Tree Nuts Hives    Penicillins Hives and Rash        Physical Exam:  Last Recorded Vitals  Height 1.676 m (5' 6\"), weight 78.9 kg (174 lb).  []General appearance: Well-developed, well-nourished in no acute distress, conversant with normal voice quality    Head/face: No erythema or edema or facial tenderness, and normal facial nerve function bilaterally    External ear: Clear external auditory canals with normal pinnae  Tube status: N/A  Middle ear: Tympanic membranes intact and mobile, middle ears normal.  Tympanic membrane perforation: N/A  Mastoid bowl: N/A  Hearing: Normal conversational awareness at normal speech thresholds    Nose visualized using: Anterior rhinoscopy  Nasal dorsum: Nontraumatic midline appearance  Septum: Septum is straight.  Splints removed.  Turbinates debrided  Inferior turbinates: Normal, pink nicely reduced  Secretions: Dry    Oral cavity and oropharynx: Normal  Teeth: Good condition  Floor of mouth: without lesions  Palate: Normal hard palate, soft palate and uvula  Oropharynx: Clear, no lesions present 2+ tonsils with deep crypts.  Buccal mucosa: Normal without masses or lesions  Lips: Normal    Nasopharynx: Inadequate mirror exam secondary to gag/anatomy    Neck:  Salivary glands: Normal bilateral parotid and submandibular glands by inspection and palpation.  Non-thyroid masses: No palpable masses or significant lymphadenopathy  Trachea: Midline  Thyroid: No thyromegaly or palpable " nodules  Temporomandibular joint: Nontender  Cervical range of motion: Normal    Neurologic exam: Alert and oriented x3, appropriate affect.  Cranial nerves II-XII normal bilaterally  Extraocular movement: Extraocular movement intact, normal gaze alignment        Edie was seen today for post-op.  Diagnoses and all orders for this visit:  Deviated nasal septum (Primary)  Nasal turbinate hypertrophy  Allergic rhinitis, unspecified seasonality, unspecified trigger         PLAN  Continue saline nasal spray.  Recheck in 1 week  Sebastian Smith MD

## 2025-06-18 ENCOUNTER — APPOINTMENT (OUTPATIENT)
Dept: PRIMARY CARE | Facility: CLINIC | Age: 20
End: 2025-06-18
Payer: COMMERCIAL

## 2025-06-18 VITALS
SYSTOLIC BLOOD PRESSURE: 113 MMHG | HEIGHT: 66 IN | TEMPERATURE: 98.4 F | HEART RATE: 97 BPM | WEIGHT: 170.8 LBS | OXYGEN SATURATION: 96 % | DIASTOLIC BLOOD PRESSURE: 76 MMHG | BODY MASS INDEX: 27.45 KG/M2

## 2025-06-18 DIAGNOSIS — E61.1 IRON DEFICIENCY: ICD-10-CM

## 2025-06-18 DIAGNOSIS — Z00.01 ENCOUNTER FOR GENERAL ADULT MEDICAL EXAMINATION WITH ABNORMAL FINDINGS: Primary | ICD-10-CM

## 2025-06-18 DIAGNOSIS — E78.00 ELEVATED LDL CHOLESTEROL LEVEL: ICD-10-CM

## 2025-06-18 DIAGNOSIS — J38.7 INDUCIBLE LARYNGEAL OBSTRUCTION (ILO): ICD-10-CM

## 2025-06-18 DIAGNOSIS — J45.40 MODERATE PERSISTENT ASTHMA WITHOUT COMPLICATION (HHS-HCC): ICD-10-CM

## 2025-06-18 DIAGNOSIS — K59.00 CONSTIPATION, UNSPECIFIED CONSTIPATION TYPE: ICD-10-CM

## 2025-06-18 DIAGNOSIS — Z01.419 WELL WOMAN EXAM WITH ROUTINE GYNECOLOGICAL EXAM: ICD-10-CM

## 2025-06-18 PROBLEM — L98.9 PRECANCEROUS SKIN LESION: Status: ACTIVE | Noted: 2025-06-18

## 2025-06-18 PROBLEM — J30.9 ALLERGIC RHINITIS: Status: RESOLVED | Noted: 2023-07-21 | Resolved: 2025-06-18

## 2025-06-18 PROBLEM — R53.83 FATIGUE: Status: ACTIVE | Noted: 2025-06-18

## 2025-06-18 PROBLEM — F32.A DEPRESSION: Status: ACTIVE | Noted: 2025-06-18

## 2025-06-18 LAB
HOLD SPECIMEN: NORMAL
HOLD SPECIMEN: NORMAL
IRON SERPL-MCNC: 48 UG/DL (ref 35–150)

## 2025-06-18 PROCEDURE — 1036F TOBACCO NON-USER: CPT | Performed by: FAMILY MEDICINE

## 2025-06-18 PROCEDURE — 3008F BODY MASS INDEX DOCD: CPT | Performed by: FAMILY MEDICINE

## 2025-06-18 PROCEDURE — UHSPHYS PR UH SELECT PHYSICAL: Performed by: FAMILY MEDICINE

## 2025-06-18 RX ORDER — METHYLPREDNISOLONE 4 MG/1
TABLET ORAL
COMMUNITY
Start: 2025-05-15

## 2025-06-18 RX ORDER — LIDOCAINE HYDROCHLORIDE 20 MG/ML
SOLUTION ORAL; TOPICAL
COMMUNITY
Start: 2025-05-15

## 2025-06-18 ASSESSMENT — ENCOUNTER SYMPTOMS
LOSS OF SENSATION IN FEET: 0
OCCASIONAL FEELINGS OF UNSTEADINESS: 0
DEPRESSION: 0

## 2025-06-18 ASSESSMENT — PATIENT HEALTH QUESTIONNAIRE - PHQ9
1. LITTLE INTEREST OR PLEASURE IN DOING THINGS: NOT AT ALL
6. FEELING BAD ABOUT YOURSELF - OR THAT YOU ARE A FAILURE OR HAVE LET YOURSELF OR YOUR FAMILY DOWN: NOT AT ALL
2. FEELING DOWN, DEPRESSED OR HOPELESS: NOT AT ALL
3. TROUBLE FALLING OR STAYING ASLEEP: NOT AT ALL
9. THOUGHTS THAT YOU WOULD BE BETTER OFF DEAD, OR OF HURTING YOURSELF: NOT AT ALL
8. MOVING OR SPEAKING SO SLOWLY THAT OTHER PEOPLE COULD HAVE NOTICED. OR THE OPPOSITE, BEING SO FIGETY OR RESTLESS THAT YOU HAVE BEEN MOVING AROUND A LOT MORE THAN USUAL: NOT AT ALL
7. TROUBLE CONCENTRATING ON THINGS, SUCH AS READING THE NEWSPAPER OR WATCHING TELEVISION: NOT AT ALL
SUM OF ALL RESPONSES TO PHQ QUESTIONS 1-9: 1
4. FEELING TIRED OR HAVING LITTLE ENERGY: SEVERAL DAYS
5. POOR APPETITE OR OVEREATING: NOT AT ALL
SUM OF ALL RESPONSES TO PHQ9 QUESTIONS 1 & 2: 0

## 2025-06-18 ASSESSMENT — LIFESTYLE VARIABLES
HOW OFTEN DO YOU HAVE SIX OR MORE DRINKS ON ONE OCCASION: NEVER
AUDIT-C TOTAL SCORE: 3
HOW OFTEN DO YOU HAVE A DRINK CONTAINING ALCOHOL: 2-3 TIMES A WEEK
SKIP TO QUESTIONS 9-10: 1
HOW MANY STANDARD DRINKS CONTAINING ALCOHOL DO YOU HAVE ON A TYPICAL DAY: 1 OR 2

## 2025-06-18 ASSESSMENT — PAIN SCALES - GENERAL: PAINLEVEL_OUTOF10: 0-NO PAIN

## 2025-06-18 NOTE — PROGRESS NOTES
"Subjective   Patient ID: Edie Verdugo is a 19 y.o. female who presents for Annual Exam ([facility]).  HPI  1) Asthma hx - recently seen by Pulm - her signs/sxs suggest ILO/VCD as well - was referred to ST, but consulted with sister who is ST and was skeptical that could help - did review this phenomenon through [facility] site and appears to be a well-described problem for which therapy may be of benefit - willing to get at least go for assessment - doing well with meds otherwise    2) MARKY - has not been regular with iron (describes fairly \"normal\" menses rel to peers) - awaiting iron test as add-on - as has some GI issues (some \"stomach upset\" and constipation) question if absorption problem (Celiac? - doesn't perfectly tolerate gluten) and has hard stools often (tried probiotic, may have tried MiraLAX in the past as well as Magnesium and has resorted to laxatives)    3) Elev LDL and Tgs -maybe off of it as she has not really been able to exercise lately due to recent surgery - but interested in checking LP(a) as her profiles have not been very good even before the hiatus from exercise     4) Prevention: appears UTD - would like to switch to Gyn at      Review of Systems  Essentially noncontributory otherwise    Objective   /76 (BP Location: Left arm, Patient Position: Sitting, BP Cuff Size: Adult)   Pulse 97   Temp 36.9 °C (98.4 °F) (Temporal)   Ht 1.673 m (5' 5.87\")   Wt 77.5 kg (170 lb 12.8 oz)   SpO2 96%   BMI 27.68 kg/m²     Physical Exam  Vitals and nursing note reviewed.   Constitutional:       General: She is not in acute distress.     Appearance: She is not ill-appearing.   HENT:      Head: Normocephalic and atraumatic.      Right Ear: Tympanic membrane normal.      Left Ear: Tympanic membrane normal.      Ears:      Comments: Some cerumen L>R     Mouth/Throat:      Pharynx: No posterior oropharyngeal erythema.   Eyes:      General: No scleral icterus.     Extraocular Movements: " Extraocular movements intact.      Pupils: Pupils are equal, round, and reactive to light.      Comments: Some lid lag bilat   Cardiovascular:      Rate and Rhythm: Normal rate and regular rhythm.      Heart sounds: No murmur heard.  Pulmonary:      Breath sounds: Normal breath sounds. No wheezing or rhonchi.   Abdominal:      General: Bowel sounds are normal. There is no distension.      Palpations: Abdomen is soft.      Tenderness: There is no abdominal tenderness.   Musculoskeletal:         General: Normal range of motion.      Cervical back: Normal range of motion.      Right lower leg: No edema.      Left lower leg: No edema.   Lymphadenopathy:      Cervical: No cervical adenopathy.   Skin:     General: Skin is warm and dry.   Neurological:      Mental Status: She is alert and oriented to person, place, and time. Mental status is at baseline.      Motor: No weakness.      Coordination: Coordination normal.      Deep Tendon Reflexes: Reflexes normal.   Psychiatric:         Mood and Affect: Mood normal.         Behavior: Behavior normal.         Thought Content: Thought content normal.         Judgment: Judgment normal.         Assessment/Plan   Problem List Items Addressed This Visit       Iron deficiency    Relevant Orders    Iron level (Completed)    Celiac Panel    Iron and TIBC    Inducible laryngeal obstruction (ILO)     Other Visit Diagnoses         Encounter for general adult medical examination with abnormal findings    -  Primary      Elevated LDL cholesterol level        Relevant Orders    Lipoprotein A (LPA)    Lipid panel      Well woman exam with routine gynecological exam        Relevant Orders    Referral to Gynecology        1) Asthma hx - cpm - enc eval by ST    2) MARKY - await Fe test, enc reg use of Iron supplement, ult check for Celiac    3) Elev LDL and Tgs - look to repeat later in year when back to exercise and ck Lp(a)    4) Prevention: appears UTD - would like to switch to Gyn at

## 2025-06-24 ENCOUNTER — APPOINTMENT (OUTPATIENT)
Dept: OTOLARYNGOLOGY | Facility: CLINIC | Age: 20
End: 2025-06-24
Payer: COMMERCIAL

## 2025-06-24 VITALS — WEIGHT: 170 LBS | HEIGHT: 66 IN | BODY MASS INDEX: 27.32 KG/M2

## 2025-06-24 DIAGNOSIS — J03.91 ACUTE RECURRENT TONSILLITIS: ICD-10-CM

## 2025-06-24 DIAGNOSIS — J34.3 NASAL TURBINATE HYPERTROPHY: ICD-10-CM

## 2025-06-24 DIAGNOSIS — J30.9 ALLERGIC RHINITIS, UNSPECIFIED SEASONALITY, UNSPECIFIED TRIGGER: ICD-10-CM

## 2025-06-24 DIAGNOSIS — J34.2 DEVIATED NASAL SEPTUM: Primary | ICD-10-CM

## 2025-06-24 PROCEDURE — 99024 POSTOP FOLLOW-UP VISIT: CPT | Performed by: OTOLARYNGOLOGY

## 2025-06-24 PROCEDURE — 1036F TOBACCO NON-USER: CPT | Performed by: OTOLARYNGOLOGY

## 2025-06-24 PROCEDURE — 3008F BODY MASS INDEX DOCD: CPT | Performed by: OTOLARYNGOLOGY

## 2025-06-24 NOTE — PROGRESS NOTES
Chief Complaint   Patient presents with    Post-op     2 WEEK S/P SEPTO/TURBS       Date of Evaluation: 6/24/2025   HPI  She returns status post septoplasty and turbinate surgery June 11, 2025.  Postoperative course was uneventful.  Doing well  Edie Verdugo is a 19 y.o. female here for evaluation of her tonsils.  She is currently in college in [place].  Over the past year and a half she has had recurrent left tonsillitis about 5 times.  The tonsils have remained somewhat enlarged.  She has a longstanding difficulty breathing through the nose right side worse than left.  She does have allergic rhinitis and asthma.  She uses Flonase.  She is snoring at night and frequently has a sore throat in the morning.  She has not had tonsillar stones.       Past Medical History:   Diagnosis Date    Allergic rhinitis, unspecified 01/11/2021    Allergic rhinitis    Atopic dermatitis, unspecified 01/26/2018    Atopic dermatitis    Dysmenorrhea, unspecified 11/02/2020    Dysmenorrhea    Moderate persistent asthma, uncomplicated ([facility]) 03/28/2022    Moderate persistent asthma without complication    Personal history of other infectious and parasitic diseases 07/25/2016    History of molluscum contagiosum    Urticaria, unspecified 05/19/2016    Hives of unknown origin      History reviewed. No pertinent surgical history.       Medications:   Current Outpatient Medications   Medication Instructions    Aerochamber Plus Flow-Vu inhaler USE AS DIRECTED    albuterol 90 mcg/actuation inhaler 2 puffs, inhalation, Every 4 hours PRN    albuterol 2.5 mg, nebulization, Every 6 hours PRN    ALPRAZolam (XANAX) 0.125-0.5 mg, oral, 2 times daily PRN    busPIRone (BUSPAR) 2.5-10 mg, oral, 2 times daily PRN    Dulera 100-5 mcg/actuation inhaler 2 puffs, inhalation, 2 times daily RT, And 2 puffs every 4 hours as needed for cough, wheeze, shortness of breath. Max 12 puffs in 24 hours.    etonogestreL-ethinyl estradioL (Nuvaring) 0.12-0.015  "mg/24 hr vaginal ring 1 each, Every 21 days    fluticasone (Flonase) 50 mcg/actuation nasal spray 1 spray, Daily    Lidocaine Viscous 2 % solution SWISH AND SWALLOW 5 ML EVERY 8 (EIGHT) HOURS FOR 12 DAYS TRAVEL ABROAD WITH    methylPREDNISolone (Medrol Dospak) 4 mg tablets     montelukast (SINGULAIR) 10 mg, oral, Nightly    norgestimate-ethinyl estradioL (Ortho-Cyclen) 0.25-35 mg-mcg tablet 1 tablet, Daily        Allergies:  Allergies   Allergen Reactions    Amoxicillin Hives    Penicillins Hives and Rash    Tree Nuts Hives        Physical Exam:  Last Recorded Vitals  Height 1.673 m (5' 5.87\"), weight 77.1 kg (170 lb).  []General appearance: Well-developed, well-nourished in no acute distress, conversant with normal voice quality    Head/face: No erythema or edema or facial tenderness, and normal facial nerve function bilaterally    External ear: Clear external auditory canals with normal pinnae  Tube status: N/A  Middle ear: Tympanic membranes intact and mobile, middle ears normal.  Tympanic membrane perforation: N/A  Mastoid bowl: N/A  Hearing: Normal conversational awareness at normal speech thresholds    Nose visualized using: Anterior rhinoscopy  Nasal dorsum: Nontraumatic midline appearance  Septum: Septum is straight.  Turbinates debrided.  Mucus suctioned from the nose  Inferior turbinates: Normal, pink nicely reduced  Secretions: Dry    Oral cavity and oropharynx: Normal  Teeth: Good condition  Floor of mouth: without lesions  Palate: Normal hard palate, soft palate and uvula  Oropharynx: Clear, no lesions present 2+ tonsils with deep crypts.  Buccal mucosa: Normal without masses or lesions  Lips: Normal    Nasopharynx: Inadequate mirror exam secondary to gag/anatomy    Neck:  Salivary glands: Normal bilateral parotid and submandibular glands by inspection and palpation.  Non-thyroid masses: No palpable masses or significant lymphadenopathy  Trachea: Midline  Thyroid: No thyromegaly or palpable " nodules  Temporomandibular joint: Nontender  Cervical range of motion: Normal    Neurologic exam: Alert and oriented x3, appropriate affect.  Cranial nerves II-XII normal bilaterally  Extraocular movement: Extraocular movement intact, normal gaze alignment        Edie was seen today for post-op.  Diagnoses and all orders for this visit:  Deviated nasal septum (Primary)  Nasal turbinate hypertrophy  Allergic rhinitis, unspecified seasonality, unspecified trigger  Acute recurrent tonsillitis           PLAN  Continue saline nasal spray.  Tonsil feeling better.  Recheck as needed     Sebastian Smith MD

## 2025-07-01 DIAGNOSIS — Z87.898 HX OF MOTION SICKNESS: Primary | ICD-10-CM

## 2025-07-01 RX ORDER — SCOPOLAMINE 1 MG/3D
1 PATCH, EXTENDED RELEASE TRANSDERMAL
Qty: 4 PATCH | Refills: 1 | Status: SHIPPED | OUTPATIENT
Start: 2025-07-01

## 2025-07-01 NOTE — PROGRESS NOTES
Family member requested this for patient     Requested Prescriptions     Signed Prescriptions Disp Refills    scopolamine (Transderm-Scop) 1 mg over 3 days patch 3 day 4 patch 1     Sig: Place 1 patch over 72 hours on the skin every 3rd day if needed (nausea).

## 2025-07-15 DIAGNOSIS — F41.9 ANXIETY: ICD-10-CM

## 2025-07-15 RX ORDER — ALPRAZOLAM 0.25 MG/1
.125-.5 TABLET ORAL 2 TIMES DAILY PRN
Qty: 20 TABLET | Refills: 1 | Status: SHIPPED | OUTPATIENT
Start: 2025-07-15

## 2025-07-15 NOTE — TELEPHONE ENCOUNTER
I have personally reviewed the OARRS report for this patient.  The report is scanned into the EMR. I have considered the risks of abuse, dependence, addiction, and diversion.    Requested Prescriptions     Signed Prescriptions Disp Refills    ALPRAZolam (Xanax) 0.25 mg tablet 20 tablet 1     Sig: Take 0.5-2 tablets (0.125-0.5 mg) by mouth 2 times a day as needed for anxiety.     Authorizing Provider: EMMANUEL VALERO

## 2025-07-18 ENCOUNTER — TELEPHONE (OUTPATIENT)
Dept: PRIMARY CARE | Facility: CLINIC | Age: 20
End: 2025-07-18
Payer: COMMERCIAL

## 2025-07-18 DIAGNOSIS — J45.40 MODERATE PERSISTENT ASTHMA WITHOUT COMPLICATION (HHS-HCC): ICD-10-CM

## 2025-07-18 DIAGNOSIS — R07.89 CHEST TIGHTNESS: Primary | ICD-10-CM

## 2025-07-18 RX ORDER — PREDNISONE 10 MG/1
TABLET ORAL
Qty: 20 TABLET | Refills: 0 | Status: SHIPPED | OUTPATIENT
Start: 2025-07-18

## 2025-07-18 NOTE — TELEPHONE ENCOUNTER
Patient reached out about having chest tightness and persistent cough  No significant wheeze  No reported fever or chills  In the past has done well with the following and recommend the same    Requested Prescriptions     Signed Prescriptions Disp Refills    predniSONE (Deltasone) 10 mg tablet 20 tablet 0     Sig: Take UP TO 4 tablets daily for UP TO 5 days     Authorizing Provider: EMMANUEL VALERO     Expect will follow-up in 48-72 hours or sooner if worsening/concerns

## 2025-08-13 ENCOUNTER — TELEMEDICINE (OUTPATIENT)
Dept: PRIMARY CARE | Facility: CLINIC | Age: 20
End: 2025-08-13
Payer: COMMERCIAL

## 2025-08-13 DIAGNOSIS — D64.9 ANEMIA, UNSPECIFIED TYPE: Primary | ICD-10-CM

## 2025-08-13 DIAGNOSIS — E61.1 IRON DEFICIENCY: ICD-10-CM

## 2025-08-13 DIAGNOSIS — F41.9 ANXIETY: ICD-10-CM

## 2025-08-13 DIAGNOSIS — J45.40 MODERATE PERSISTENT ASTHMA WITHOUT COMPLICATION (HHS-HCC): ICD-10-CM

## 2025-08-13 PROCEDURE — 99215 OFFICE O/P EST HI 40 MIN: CPT | Performed by: FAMILY MEDICINE

## 2025-08-13 RX ORDER — MONTELUKAST SODIUM 5 MG/1
5 TABLET, CHEWABLE ORAL NIGHTLY
Qty: 30 TABLET | Refills: 11 | Status: SHIPPED | OUTPATIENT
Start: 2025-08-13 | End: 2026-08-13

## 2025-08-16 LAB
BASOPHILS # BLD AUTO: 63 CELLS/UL (ref 0–200)
BASOPHILS NFR BLD AUTO: 0.8 %
EOSINOPHIL # BLD AUTO: 198 CELLS/UL (ref 15–500)
EOSINOPHIL NFR BLD AUTO: 2.5 %
ERYTHROCYTE [DISTWIDTH] IN BLOOD BY AUTOMATED COUNT: 15.1 % (ref 11–15)
HCT VFR BLD AUTO: 41.5 % (ref 35–45)
HGB BLD-MCNC: 13 G/DL (ref 11.7–15.5)
LYMPHOCYTES # BLD AUTO: 3073 CELLS/UL (ref 850–3900)
LYMPHOCYTES NFR BLD AUTO: 38.9 %
MCH RBC QN AUTO: 26.7 PG (ref 27–33)
MCHC RBC AUTO-ENTMCNC: 31.3 G/DL (ref 32–36)
MCV RBC AUTO: 85.2 FL (ref 80–100)
MONOCYTES # BLD AUTO: 545 CELLS/UL (ref 200–950)
MONOCYTES NFR BLD AUTO: 6.9 %
NEUTROPHILS # BLD AUTO: 4021 CELLS/UL (ref 1500–7800)
NEUTROPHILS NFR BLD AUTO: 50.9 %
PLATELET # BLD AUTO: 235 THOUSAND/UL (ref 140–400)
PMV BLD REES-ECKER: 11.9 FL (ref 7.5–12.5)
RBC # BLD AUTO: 4.87 MILLION/UL (ref 3.8–5.1)
WBC # BLD AUTO: 7.9 THOUSAND/UL (ref 3.8–10.8)

## 2025-08-19 ENCOUNTER — APPOINTMENT (OUTPATIENT)
Dept: PRIMARY CARE | Facility: CLINIC | Age: 20
End: 2025-08-19
Payer: COMMERCIAL

## 2025-08-20 LAB
IRON SATN MFR SERPL: 11 % (CALC) (ref 16–45)
IRON SERPL-MCNC: 60 MCG/DL (ref 40–190)
TIBC SERPL-MCNC: 560 MCG/DL (CALC) (ref 250–450)

## 2025-08-21 LAB
GLIADIN IGA SER IA-ACNC: 2.6 U/ML
GLIADIN IGG SER IA-ACNC: <1 U/ML
IGA SERPL-MCNC: 132 MG/DL (ref 47–310)
TTG IGA SER-ACNC: <1 U/ML
TTG IGG SER-ACNC: <1 U/ML

## 2025-08-23 LAB
CHOLEST SERPL-MCNC: 288 MG/DL
CHOLEST/HDLC SERPL: 4.7 (CALC)
HDLC SERPL-MCNC: 61 MG/DL
LDLC SERPL CALC-MCNC: 191 MG/DL (CALC)
LPA SERPL-SCNC: 53 NMOL/L
NONHDLC SERPL-MCNC: 227 MG/DL (CALC)
TRIGL SERPL-MCNC: 187 MG/DL

## 2025-12-22 ENCOUNTER — APPOINTMENT (OUTPATIENT)
Facility: CLINIC | Age: 20
End: 2025-12-22
Payer: COMMERCIAL